# Patient Record
Sex: MALE | Race: WHITE | NOT HISPANIC OR LATINO | URBAN - METROPOLITAN AREA
[De-identification: names, ages, dates, MRNs, and addresses within clinical notes are randomized per-mention and may not be internally consistent; named-entity substitution may affect disease eponyms.]

---
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
## 2018-11-01 ENCOUNTER — APPOINTMENT (OUTPATIENT)
Dept: DERMATOLOGY | Facility: CLINIC | Age: 18
End: 2018-11-01
Payer: MEDICAID

## 2018-11-01 VITALS — BODY MASS INDEX: 26.22 KG/M2 | HEIGHT: 68 IN | WEIGHT: 173 LBS

## 2018-11-01 DIAGNOSIS — M67.40 GANGLION, UNSPECIFIED SITE: ICD-10-CM

## 2018-11-01 PROCEDURE — 99203 OFFICE O/P NEW LOW 30 MIN: CPT

## 2018-12-07 ENCOUNTER — APPOINTMENT (OUTPATIENT)
Dept: PEDIATRIC CARDIOLOGY | Facility: CLINIC | Age: 18
End: 2018-12-07

## 2019-01-08 ENCOUNTER — FORM ENCOUNTER (OUTPATIENT)
Age: 19
End: 2019-01-08

## 2019-01-09 ENCOUNTER — OUTPATIENT (OUTPATIENT)
Dept: OUTPATIENT SERVICES | Facility: HOSPITAL | Age: 19
LOS: 1 days | End: 2019-01-09
Payer: MEDICAID

## 2019-01-09 VITALS — HEART RATE: 60 BPM | SYSTOLIC BLOOD PRESSURE: 110 MMHG | DIASTOLIC BLOOD PRESSURE: 56 MMHG

## 2019-01-09 VITALS — HEART RATE: 60 BPM | SYSTOLIC BLOOD PRESSURE: 111 MMHG | DIASTOLIC BLOOD PRESSURE: 54 MMHG

## 2019-01-09 DIAGNOSIS — Q22.5 EBSTEIN'S ANOMALY: ICD-10-CM

## 2019-01-09 DIAGNOSIS — F84.0 AUTISTIC DISORDER: ICD-10-CM

## 2019-01-09 DIAGNOSIS — Z86.79 PERSONAL HISTORY OF OTHER DISEASES OF THE CIRCULATORY SYSTEM: ICD-10-CM

## 2019-01-09 LAB
ANION GAP SERPL CALC-SCNC: 10 MMOL/L — SIGNIFICANT CHANGE UP (ref 5–17)
BUN SERPL-MCNC: 11 MG/DL — SIGNIFICANT CHANGE UP (ref 8–20)
CALCIUM SERPL-MCNC: 8.6 MG/DL — SIGNIFICANT CHANGE UP (ref 8.6–10.2)
CHLORIDE SERPL-SCNC: 102 MMOL/L — SIGNIFICANT CHANGE UP (ref 98–107)
CO2 SERPL-SCNC: 26 MMOL/L — SIGNIFICANT CHANGE UP (ref 22–29)
CREAT SERPL-MCNC: 0.81 MG/DL — SIGNIFICANT CHANGE UP (ref 0.5–1.3)
GLUCOSE SERPL-MCNC: 97 MG/DL — SIGNIFICANT CHANGE UP (ref 70–115)
HCT VFR BLD CALC: 35 % — LOW (ref 42–52)
HGB BLD-MCNC: 11.4 G/DL — LOW (ref 14–18)
MCHC RBC-ENTMCNC: 30.1 PG — SIGNIFICANT CHANGE UP (ref 27–31)
MCHC RBC-ENTMCNC: 32.6 G/DL — SIGNIFICANT CHANGE UP (ref 32–36)
MCV RBC AUTO: 92.3 FL — SIGNIFICANT CHANGE UP (ref 80–94)
PLATELET # BLD AUTO: 204 K/UL — SIGNIFICANT CHANGE UP (ref 150–400)
POTASSIUM SERPL-MCNC: 3.6 MMOL/L — SIGNIFICANT CHANGE UP (ref 3.5–5.3)
POTASSIUM SERPL-SCNC: 3.6 MMOL/L — SIGNIFICANT CHANGE UP (ref 3.5–5.3)
RBC # BLD: 3.79 M/UL — LOW (ref 4.6–6.2)
RBC # FLD: 13.4 % — SIGNIFICANT CHANGE UP (ref 11–15.6)
SODIUM SERPL-SCNC: 138 MMOL/L — SIGNIFICANT CHANGE UP (ref 135–145)
WBC # BLD: 5.1 K/UL — SIGNIFICANT CHANGE UP (ref 4.8–10.8)
WBC # FLD AUTO: 5.1 K/UL — SIGNIFICANT CHANGE UP (ref 4.8–10.8)

## 2019-01-09 PROCEDURE — 93303 ECHO TRANSTHORACIC: CPT | Mod: 26

## 2019-01-09 PROCEDURE — 93325 DOPPLER ECHO COLOR FLOW MAPG: CPT

## 2019-01-09 PROCEDURE — 93010 ELECTROCARDIOGRAM REPORT: CPT

## 2019-01-09 PROCEDURE — 80048 BASIC METABOLIC PNL TOTAL CA: CPT

## 2019-01-09 PROCEDURE — 36415 COLL VENOUS BLD VENIPUNCTURE: CPT

## 2019-01-09 PROCEDURE — 93320 DOPPLER ECHO COMPLETE: CPT | Mod: 26

## 2019-01-09 PROCEDURE — 85027 COMPLETE CBC AUTOMATED: CPT

## 2019-01-09 PROCEDURE — 93325 DOPPLER ECHO COLOR FLOW MAPG: CPT | Mod: 26

## 2019-01-09 PROCEDURE — 93303 ECHO TRANSTHORACIC: CPT

## 2019-01-09 PROCEDURE — 93005 ELECTROCARDIOGRAM TRACING: CPT

## 2019-01-09 PROCEDURE — 93320 DOPPLER ECHO COMPLETE: CPT

## 2019-01-09 PROCEDURE — 99215 OFFICE O/P EST HI 40 MIN: CPT | Mod: 25

## 2019-01-09 PROCEDURE — 99354: CPT

## 2019-01-09 RX ORDER — ASPIRIN/CALCIUM CARB/MAGNESIUM 324 MG
1 TABLET ORAL
Qty: 0 | Refills: 0 | COMMUNITY

## 2019-01-09 RX ORDER — MIDAZOLAM HYDROCHLORIDE 1 MG/ML
20 INJECTION, SOLUTION INTRAMUSCULAR; INTRAVENOUS ONCE
Qty: 0 | Refills: 0 | Status: DISCONTINUED | OUTPATIENT
Start: 2019-01-09 | End: 2019-01-09

## 2019-01-09 RX ADMIN — MIDAZOLAM HYDROCHLORIDE 20 MILLIGRAM(S): 1 INJECTION, SOLUTION INTRAMUSCULAR; INTRAVENOUS at 08:59

## 2019-01-24 NOTE — CARDIOLOGY SUMMARY
[Today's Date] : [unfilled] [LVSF ___%] : LV Shortening Fraction [unfilled]% [FreeTextEntry1] : NSR @ 57 bpm. RBBB, nonspecific T wave abnormality Prolonged QTc of 494 ms, due to prolonged QRS. Unchanged from previous study. [FreeTextEntry2] : Summary:  \par 1. 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 S/P Cone procedure of Tricuspid valve with right sided Maze procedure October 2010.\par 3. Mild tricuspid valve regurgitation with two jets noted.\par 4. Mildly dilated right atrium.\par 5. Moderately dilated right ventricle.\par 6. Normal left ventricular morphology and systolic function.\par 7. Intact ventricular septum and dyskinesia of ventricular septum related to right bundle branch block.\par 8. Mild mitral valve prolapse\par 9. Mild pulmonary valve regurgitation.\par 10. No pericardial effusion.

## 2019-01-24 NOTE — PHYSICAL EXAM
[General Appearance - Alert] : alert [General Appearance - In No Acute Distress] : in no acute distress [General Appearance - Well Nourished] : well nourished [General Appearance - Well Developed] : well developed [General Appearance - Well-Appearing] : well appearing [Appearance Of Head] : the head was normocephalic [Facies] : there were no dysmorphic facial features [Sclera] : the conjunctiva were normal [Outer Ear] : the ears and nose were normal in appearance [Examination Of The Oral Cavity] : mucous membranes were moist and pink [Normal Chest Appearance] : the chest was normal in appearance [Chest Palpation Tender Sternum] : no chest wall tenderness [Sternotomy] : sternotomy [Clean] : clean [Dry] : dry [Well-Healed] : well-healed [Flat/Soft] : flat and soft [Apical Impulse] : quiet precordium with normal apical impulse [Heart Rate And Rhythm] : normal heart rate and rhythm [Heart Sounds] : normal S1 and S2 [No Murmur] : no murmurs  [Heart Sounds Gallop] : no gallops [Heart Sounds Pericardial Friction Rub] : no pericardial rub [Heart Sounds Click] : no clicks [Arterial Pulses] : normal upper and lower extremity pulses with no pulse delay [Edema] : no edema [Capillary Refill Test] : normal capillary refill [Systolic] : systolic [II] : a grade 2/6 [LMSB] : LMSB  [Holosystolic] : holosystolic [Low] : low pitched [Blowing] : blowing [Early] : early [Musculoskeletal Exam: Normal Movement Of All Extremities] : normal movements of all extremities [Musculoskeletal - Swelling] : no joint swelling seen [Musculoskeletal - Tenderness] : no joint tenderness was elicited [Nail Clubbing] : no clubbing  or cyanosis of the fingers [Cervical Lymph Nodes Enlarged Anterior] : The anterior cervical nodes were normal [Cervical Lymph Nodes Enlarged Posterior] : The posterior cervical nodes were normal [Skin Lesions] : no lesions [Skin Turgor] : normal turgor [Auscultation Breath Sounds / Voice Sounds] : breath sounds clear to auscultation bilaterally [Bowel Sounds] : normal bowel sounds [Abdomen Soft] : soft [Nondistended] : nondistended [Abdomen Tenderness] : non-tender [] : no hepato-splenomegaly [Motor Tone] : muscle strength and tone were normal [Demonstrated Behavior - Infant Nonreactive To Parents] : interactive [Mood] : mood and affect were appropriate for age [Demonstrated Behavior] : normal behavior [FreeTextEntry1] : frontal healed scar from previous head-banging.

## 2019-01-24 NOTE — CONSULT LETTER
[Today's Date] : [unfilled] [Name] : Name: [unfilled] [] : : ~~ [Today's Date:] : [unfilled] [Dear  ___:] : Dear Dr. [unfilled]: [Consult] : I had the pleasure of evaluating your patient, [unfilled]. My full evaluation follows. [Consult - Single Provider] : Thank you very much for allowing me to participate in the care of this patient. If you have any questions, please do not hesitate to contact me. [Sincerely,] : Sincerely, [David Napier MD, FAAP, FACC, FASE] : David Napier MD, FAAP, FACC, FASE [Pediatric Cardiologist] : Pediatric Cardiologist [FreeTextEntry4] : Keerthi Tello MD [FreeTextEntry5] : [address] [FreeTextEntry6] : [place] [address]

## 2019-01-24 NOTE — REASON FOR VISIT
[Follow-Up] : a follow-up visit for [S/P Cardiac Surgery] : status post cardiac surgery [Mother] : mother [FreeTextEntry1] : Ebstein's Anomaly

## 2019-01-24 NOTE — DISCUSSION/SUMMARY
[Needs SBE Prophylaxis] : [unfilled] does not need bacterial endocarditis prophylaxis [FreeTextEntry1] : No cardiac contraindications for Behavioral plan deemed necessary by Medical director of [facility].

## 2019-01-24 NOTE — HISTORY OF PRESENT ILLNESS
[FreeTextEntry1] : Silvio is almost 18-year-old, who is been followed by me for severe Autism, Ebstein's anomaly, status post repair. He is being seen as a followup visit since his last evaluation a 1 years back.  He was examined while he is get sedated echocardiogram today.  We have tried to do a visit in office, but he has not been cooperative. He was seen during sedation today at [facility]. Since then according to his mother, he is doing well.  He is nonverbal. He has no symptoms regarding the cardiovascular system in the form of chest pain, palpitations, dizziness, easy fatigability, shortness of breath or syncope.  He has had emergency room visits for stitches in his head in the past. He had allergic reaction to Ketamine for his oral surgery at [facility] and had seizures. He has had no recent major illnesses or any hospitalizations.There is no interval changes the health status. \par Past medical history: He had cone procedure, right sided maze and primary PFO closure on October 8, 2010 by Dr. Tera Ramsey at [facility]. He also had Reveal arrhythmia detection device placed that became end of life. He was followed by GI and by psychiatry.

## 2020-01-09 ENCOUNTER — APPOINTMENT (OUTPATIENT)
Dept: PEDIATRIC CARDIOLOGY | Facility: CLINIC | Age: 20
End: 2020-01-09

## 2020-01-14 ENCOUNTER — FORM ENCOUNTER (OUTPATIENT)
Age: 20
End: 2020-01-14

## 2020-01-15 ENCOUNTER — OUTPATIENT (OUTPATIENT)
Dept: OUTPATIENT SERVICES | Facility: HOSPITAL | Age: 20
LOS: 1 days | End: 2020-01-15
Payer: MEDICAID

## 2020-01-15 VITALS — RESPIRATION RATE: 16 BRPM | HEART RATE: 53 BPM | OXYGEN SATURATION: 96 %

## 2020-01-15 VITALS
DIASTOLIC BLOOD PRESSURE: 43 MMHG | RESPIRATION RATE: 18 BRPM | SYSTOLIC BLOOD PRESSURE: 96 MMHG | OXYGEN SATURATION: 99 % | HEART RATE: 50 BPM | WEIGHT: 160.94 LBS | HEIGHT: 66.14 IN | BODY MASS INDEX: 25.86 KG/M2

## 2020-01-15 VITALS — WEIGHT: 160.06 LBS | HEIGHT: 67 IN

## 2020-01-15 DIAGNOSIS — Z98.890 OTHER SPECIFIED POSTPROCEDURAL STATES: ICD-10-CM

## 2020-01-15 DIAGNOSIS — Q22.5 EBSTEIN'S ANOMALY: ICD-10-CM

## 2020-01-15 DIAGNOSIS — F84.0 AUTISTIC DISORDER: ICD-10-CM

## 2020-01-15 DIAGNOSIS — Z86.79 PERSONAL HISTORY OF OTHER DISEASES OF THE CIRCULATORY SYSTEM: ICD-10-CM

## 2020-01-15 PROCEDURE — 93306 TTE W/DOPPLER COMPLETE: CPT

## 2020-01-15 PROCEDURE — 80074 ACUTE HEPATITIS PANEL: CPT

## 2020-01-15 PROCEDURE — 36415 COLL VENOUS BLD VENIPUNCTURE: CPT

## 2020-01-15 PROCEDURE — 93005 ELECTROCARDIOGRAM TRACING: CPT

## 2020-01-15 PROCEDURE — 85610 PROTHROMBIN TIME: CPT

## 2020-01-15 PROCEDURE — 84443 ASSAY THYROID STIM HORMONE: CPT

## 2020-01-15 PROCEDURE — 93010 ELECTROCARDIOGRAM REPORT: CPT

## 2020-01-15 PROCEDURE — 85730 THROMBOPLASTIN TIME PARTIAL: CPT

## 2020-01-15 RX ORDER — DIVALPROEX SODIUM 500 MG/1
TABLET, DELAYED RELEASE ORAL
Refills: 0 | Status: ACTIVE | COMMUNITY

## 2020-01-15 NOTE — HISTORY OF PRESENT ILLNESS
[FreeTextEntry1] : Silvio is  19-year-old, who is been followed by me for severe Autism, Ebstein's anomaly, status post repair. He is being seen as a followup visit since his last evaluation a 1 years back.  He was examined while he is get sedated echocardiogram today at [facility].  We have tried to do a visit in office, but he has not been cooperative. He was seen during sedation today at [facility]. Since then according to his mother, he is doing well.  He is nonverbal. He has no symptoms regarding the cardiovascular system in the form of chest pain, palpitations, dizziness, easy fatigability, shortness of breath or syncope.  He has had emergency room visits for stitches in his head in the past. He had allergic reaction to Ketamine for his oral surgery at [facility] and had seizures. He has had no recent major illnesses or any hospitalizations.There is no interval changes the health status. \par Past medical history: He had cone procedure, right sided maze and primary PFO closure on October 8, 2010 by Dr. Tera Ramsey at [facility]. He also had Reveal arrhythmia detection device placed that became end of life. He was followed by GI and by psychiatry.

## 2020-01-15 NOTE — CARDIOLOGY SUMMARY
[Today's Date] : [unfilled] [LVSF ___%] : LV Shortening Fraction [unfilled]% [FreeTextEntry1] : NSR @ 49 bpm, first degree AV block.. IPE=886 RBBB, nonspecific T wave abnormality Prolonged QTc of 466 ms, due to prolonged QRS. Abnormal EKGy. [FreeTextEntry2] : Summary:  \par 1. 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 S/P Cone procedure of Tricuspid valve with right sided Maze procedure October 2010.\par 3. Mild tricuspid valve regurgitation with two jets noted.\par 4. Mildly dilated right atrium.\par 5. Moderately dilated right ventricle.\par 6. Normal left ventricular morphology and systolic function.\par 7. Intact ventricular septum and dyskinesia of ventricular septum .\par 8. Mild mitral valve prolapse\par 9. Mild pulmonary valve regurgitation.\par 10. No pericardial effusion.

## 2020-01-15 NOTE — PHYSICAL EXAM
[General Appearance - Alert] : alert [General Appearance - Well Nourished] : well nourished [General Appearance - Well-Appearing] : well appearing [General Appearance - In No Acute Distress] : in no acute distress [General Appearance - Well Developed] : well developed [Facies] : there were no dysmorphic facial features [Appearance Of Head] : the head was normocephalic [Sclera] : the conjunctiva were normal [Examination Of The Oral Cavity] : mucous membranes were moist and pink [Outer Ear] : the ears and nose were normal in appearance [Normal Chest Appearance] : the chest was normal in appearance [Chest Palpation Tender Sternum] : no chest wall tenderness [Clean] : clean [Sternotomy] : sternotomy [Flat/Soft] : flat and soft [Dry] : dry [Well-Healed] : well-healed [Heart Rate And Rhythm] : normal heart rate and rhythm [Apical Impulse] : quiet precordium with normal apical impulse [Heart Sounds] : normal S1 and S2 [Heart Sounds Gallop] : no gallops [No Murmur] : no murmurs  [Heart Sounds Pericardial Friction Rub] : no pericardial rub [Heart Sounds Click] : no clicks [Capillary Refill Test] : normal capillary refill [Edema] : no edema [Arterial Pulses] : normal upper and lower extremity pulses with no pulse delay [II] : a grade 2/6 [Systolic] : systolic [Holosystolic] : holosystolic [LMSB] : LMSB  [Low] : low pitched [Blowing] : blowing [Early] : early [Musculoskeletal - Swelling] : no joint swelling seen [Musculoskeletal Exam: Normal Movement Of All Extremities] : normal movements of all extremities [Musculoskeletal - Tenderness] : no joint tenderness was elicited [Nail Clubbing] : no clubbing  or cyanosis of the fingers [Cervical Lymph Nodes Enlarged Posterior] : The posterior cervical nodes were normal [Cervical Lymph Nodes Enlarged Anterior] : The anterior cervical nodes were normal [Skin Lesions] : no lesions [Skin Turgor] : normal turgor [Bowel Sounds] : normal bowel sounds [Abdomen Soft] : soft [Auscultation Breath Sounds / Voice Sounds] : breath sounds clear to auscultation bilaterally [] : no hepato-splenomegaly [Nondistended] : nondistended [Abdomen Tenderness] : non-tender [Mood] : mood and affect were appropriate for age [Motor Tone] : muscle strength and tone were normal [Demonstrated Behavior - Infant Nonreactive To Parents] : interactive [Demonstrated Behavior] : normal behavior [FreeTextEntry1] : frontal healed scar from previous head-banging.

## 2020-01-15 NOTE — REVIEW OF SYSTEMS
[Feeling Poorly] : not feeling poorly (malaise) [Pallor] : not pale [Fever] : no fever [Wgt Loss (___ Lbs)] : no recent weight loss [Eye Discharge] : no eye discharge [Redness] : no redness [Change in Vision] : no change in vision [Sore Throat] : no sore throat [Nasal Stuffiness] : no nasal congestion [Earache] : no earache [Loss Of Hearing] : no hearing loss [Cyanosis] : no cyanosis [Edema] : no edema [Diaphoresis] : not diaphoretic [Chest Pain] : no chest pain or discomfort [Palpitations] : no palpitations [Exercise Intolerance] : no persistence of exercise intolerance [Fast HR] : no tachycardia [Orthopnea] : no orthopnea [Tachypnea] : not tachypneic [Wheezing] : no wheezing [Cough] : no cough [Diarrhea] : no diarrhea [Vomiting] : no vomiting [Shortness Of Breath] : not expressed as feeling short of breath [Abdominal Pain] : no abdominal pain [Decrease In Appetite] : appetite not decreased [Fainting (Syncope)] : no fainting [Headache] : no headache [Seizure] : no seizures [Dizziness] : no dizziness [Limping] : no limping [Joint Pains] : no arthralgias [Wound problems] : no wound problems [Joint Swelling] : no joint swelling [Rash] : no rash [Easy Bruising] : no tendency for easy bruising [Swollen Glands] : no lymphadenopathy [Easy Bleeding] : no ~M tendency for easy bleeding [Sleep Disturbances] : ~T no sleep disturbances [Nosebleeds] : no epistaxis [Depression] : no depression [Anxiety] : no anxiety [Hyperactive] : no hyperactive behavior [Short Stature] : short stature was not noted [Failure To Thrive] : no failure to thrive [Jitteriness] : no jitteriness [Heat/Cold Intolerance] : no temperature intolerance [Dec Urine Output] : no oliguria

## 2020-01-15 NOTE — CONSULT LETTER
[Today's Date] : [unfilled] [Name] : Name: [unfilled] [] : : ~~ [Today's Date:] : [unfilled] [Dear  ___:] : Dear Dr. [unfilled]: [Consult] : I had the pleasure of evaluating your patient, [unfilled]. My full evaluation follows. [Consult - Single Provider] : Thank you very much for allowing me to participate in the care of this patient. If you have any questions, please do not hesitate to contact me. [Sincerely,] : Sincerely, [Pediatric Cardiologist] : Pediatric Cardiologist [David Napier MD, FAAP, FACC, FASE] : David Napier MD, FAAP, FACC, FASE [FreeTextEntry4] : DOLORES Nicholas MD [FreeTextEntry6] : [address] [facility] [FreeTextEntry5] : [facility] [FreeTextEntry7] : [place] [address] [de-identified] : David Napier MD, FACC, SVETA, FAAP\par Pediatric Cardiologist\par  Pediatrics, [facility] [employer]\par [facility]\par [address] [place] [address], [address]\[place] [address]\par \par

## 2020-01-15 NOTE — DISCUSSION/SUMMARY
[Needs SBE Prophylaxis] : [unfilled] does not need bacterial endocarditis prophylaxis [FreeTextEntry1] : No cardiac contraindications for Behavioral plan deemed necessary by Medical director of [facility].

## 2020-03-05 ENCOUNTER — APPOINTMENT (OUTPATIENT)
Dept: PEDIATRIC CARDIOLOGY | Facility: CLINIC | Age: 20
End: 2020-03-05

## 2020-12-22 ENCOUNTER — NON-APPOINTMENT (OUTPATIENT)
Age: 20
End: 2020-12-22

## 2020-12-22 ENCOUNTER — OUTPATIENT (OUTPATIENT)
Dept: OUTPATIENT SERVICES | Facility: HOSPITAL | Age: 20
LOS: 1 days | End: 2020-12-22
Payer: MEDICAID

## 2020-12-22 VITALS
WEIGHT: 145.06 LBS | SYSTOLIC BLOOD PRESSURE: 104 MMHG | TEMPERATURE: 97 F | DIASTOLIC BLOOD PRESSURE: 70 MMHG | RESPIRATION RATE: 16 BRPM | HEART RATE: 90 BPM | OXYGEN SATURATION: 97 % | HEIGHT: 66 IN

## 2020-12-22 DIAGNOSIS — Q22.5 EBSTEIN'S ANOMALY: ICD-10-CM

## 2020-12-22 DIAGNOSIS — K02.62 DENTAL CARIES ON SMOOTH SURFACE PENETRATING INTO DENTIN: ICD-10-CM

## 2020-12-22 DIAGNOSIS — R56.9 UNSPECIFIED CONVULSIONS: ICD-10-CM

## 2020-12-22 DIAGNOSIS — K05.6 PERIODONTAL DISEASE, UNSPECIFIED: ICD-10-CM

## 2020-12-22 DIAGNOSIS — Z98.890 OTHER SPECIFIED POSTPROCEDURAL STATES: Chronic | ICD-10-CM

## 2020-12-22 DIAGNOSIS — Z01.818 ENCOUNTER FOR OTHER PREPROCEDURAL EXAMINATION: ICD-10-CM

## 2020-12-22 DIAGNOSIS — K02.9 DENTAL CARIES, UNSPECIFIED: ICD-10-CM

## 2020-12-22 DIAGNOSIS — Z92.89 PERSONAL HISTORY OF OTHER MEDICAL TREATMENT: Chronic | ICD-10-CM

## 2020-12-22 PROCEDURE — G0463: CPT

## 2020-12-22 RX ORDER — LIDOCAINE HCL 20 MG/ML
0.2 VIAL (ML) INJECTION ONCE
Refills: 0 | Status: DISCONTINUED | OUTPATIENT
Start: 2020-12-28 | End: 2021-01-11

## 2020-12-22 RX ORDER — SODIUM CHLORIDE 9 MG/ML
3 INJECTION INTRAMUSCULAR; INTRAVENOUS; SUBCUTANEOUS EVERY 8 HOURS
Refills: 0 | Status: DISCONTINUED | OUTPATIENT
Start: 2020-12-28 | End: 2021-01-11

## 2020-12-22 NOTE — H&P PST ADULT - HISTORY OF PRESENT ILLNESS
20 Year OLD MALE accompanied by mom and aid with PMH of Seizures ( 4 episodes last episode 11/25/2020 with ER visit followed by neuro last visit 12/3/2020), Severe Autism, Ebstein's anamoly/SVT s/p Cone procedure, right sided maze and primary PFO closure 2010, had loop recorder in the past which was removed in 2017  planned for comprehensive dental treatment under anesthesia on 12/28/2020.    *** covid  12/26 @ [facility]     ****According to mom, 2018 for dental procedure [facility] patient received ketamine and had a seizure episode requiring ER visit, denies any hospitalization or intubation, patient had dental procedure 12/2019 with Dr. Guerrier @ [facility] with Pro 20 Year OLD MALE accompanied by mom and aid with PMH of Seizures ( 4 episodes last episode 11/25/2020 with ER visit followed by neuro last visit 12/3/2020), Severe Autism, Ebstein's anamoly/SVT s/p Cone procedure, right sided maze and primary PFO closure 2010, had loop recorder in the past which was removed in 2017  planned for comprehensive dental treatment under anesthesia on 12/28/2020.    *** covid  12/26 @ [facility]     ****According to mom, 2018 for dental procedure @ [facility] patient received ketamine and had a seizure episode requiring ER visit, denies any hospitalization or intubation, patient had dental procedure 12/2019 with Dr. Guerrier @ [facility] with no reactions or complications.  20 Year OLD MALE accompanied by mom and aid with PMH of Seizures ( 4 episodes last episode 11/25/2020 with ER visit followed by neuro last visit 12/3/2020), Severe Autism, Ebstein's anamoly/SVT s/p Cone procedure, right sided maze and primary PFO closure 2010, had loop recorder in the past which was removed in 2017  planned for comprehensive dental treatment under anesthesia on 12/28/2020.    *** covid  12/26 @ [facility]     ****According to mom, 2018 for dental procedure @ [facility] patient received ketamine and had a seizure episode requiring ER visit, denies any hospitalization or intubation, patient had dental procedure 12/2019 with Dr. Guerrier @ [facility] using Propofol with no reactions or complications.  20 Year OLD MALE accompanied by mom and aid with PMH of Seizures ( 4 episodes last episode 11/25/2020 with ER visit followed by neuro last visit 12/3/2020), Severe Autism, Ebstein's anamoly/SVT s/p Cone procedure, right sided maze and primary PFO closure 2010, had loop recorder in the past which was removed in 2017  planned for comprehensive dental treatment under anesthesia on 12/28/2020.    *** [facility]     ****According to mom, 2018 for dental procedure @ [facility] patient received ketamine and had a seizure episode requiring ER visit, denies any hospitalization or intubation, patient had dental procedure 12/2019 with Dr. Guerrier @ [facility] using Propofol with no reactions or complications.   **** Case discussed with anesthesia via teams, Dr. Morris recommended cardiac eval and echo prior to procedure   *****12/22/2020 1520 Called spoke to group home regarding yearly cardiac evaluation date, group home awaiting call back from Dr. mccallum's office regarding appointment. Called Spoke to Dr. Sherman and discussed,  She will speak to Group home regarding this.  received call back from Dr. Sherman, she will speak to patient's cardiologist and discuss case with Dr. Morris.  20 Year OLD MALE accompanied by mom and aid with PMH of Seizures ( 4 episodes last episode 11/25/2020 with ER visit followed by neuro last visit 12/3/2020), Severe Autism, Ebstein's anamoly/SVT s/p Cone procedure, right sided maze and primary PFO closure 2010, had loop recorder in the past which was removed in 2017  planned for comprehensive dental treatment under anesthesia on 12/28/2020.    *** covid  12/26 @ [facility]     ****According to mom, 2018 for dental procedure @ [facility] patient received ketamine and had a seizure episode requiring ER visit, denies any hospitalization or intubation, patient had dental procedure 12/2019 with Dr. Guerrier @ [facility] using Propofol with no reactions or complications.   **** Case discussed with anesthesia via teams, Dr. Morris recommended cardiac eval and echo prior to procedure   *****12/22/2020 1520 Called spoke to group home regarding yearly cardiac evaluation date, group home awaiting call back from Dr. mccallum's office regarding appointment. Called Spoke to Dr. Sherman and discussed,  She will speak to Group home regarding this.  received call back from Dr. Sherman, she will speak to patient's cardiologist and discuss case with Dr. Morris.     ****12/22 1540 according to ,  ( cardio) office will be faxing a cardiac clearance letter to [facility].

## 2020-12-22 NOTE — H&P PST ADULT - NSICDXPASTMEDICALHX_GEN_ALL_CORE_FT
PAST MEDICAL HISTORY:  Autism severe    Dental caries     Ebstein anomaly sx 2010    Seizures onset 1604-0779, last episode 11/25/2020 with ER visit, followed by neuro last visit 12/3/2020 as per mom

## 2020-12-22 NOTE — H&P PST ADULT - LAST ECHOCARDIOGRAM
1/2020 1/2020 Severe ebstein's anomaly of tricuspid valve, s/p cone procedure, mild TR, normal LV, LV shortening 39%.

## 2020-12-22 NOTE — H&P PST ADULT - ANESTHESIA, PREVIOUS REACTION, PROFILE
2018 with dental procedure [facility] patient had ketamine prior to dental procedure went into seizure episode and ER visit

## 2020-12-22 NOTE — H&P PST ADULT - NSICDXPASTSURGICALHX_GEN_ALL_CORE_FT
PAST SURGICAL HISTORY:  H/O heart surgery 2010 planned for comprehensive dental treatment under anesthesia on 12/28/2020.      History of dental surgery 2019 with Dr. Esquivel at [facility] tolerated well     PAST SURGICAL HISTORY:  H/O heart surgery 2010       History of dental surgery 2019 with Dr. Esquivel at [facility] tolerated well

## 2020-12-22 NOTE — H&P PST ADULT - NSICDXPROBLEM_GEN_ALL_CORE_FT
PROBLEM DIAGNOSES  Problem: Dental caries  Assessment and Plan:     Problem: Ebstein anomaly  Assessment and Plan: last EKG/echo/cardiac evaal 1/2020 on file    Problem: Seizures  Assessment and Plan: seizure precuaitons  antiseizure meds am of procedure      R/O PROBLEM DIAGNOSES  Problem: Ebstein anomaly  Assessment and Plan:      PROBLEM DIAGNOSES  Problem: Dental caries  Assessment and Plan:     Problem: Ebstein anomaly  Assessment and Plan: last EKG/echo/cardiac evaal 1/2020 on file    Problem: Seizures  Assessment and Plan: seizure precuaitons  antiseizure meds am of procedure  will obtain neuro eval 12/3      R/O PROBLEM DIAGNOSES  Problem: Ebstein anomaly  Assessment and Plan:      PROBLEM DIAGNOSES  Problem: Dental caries  Assessment and Plan: planned for comprehensive dental treatment under anesthesia on 12/28/2020.  comprehensive dental treatment under anesthesia   will obtain recent labs and medical eval from PCP  given instructions to mom and aid    Problem: Ebstein anomaly  Assessment and Plan: last EKG/echo/cardiac evaal 1/2020 on file       Problem: Seizures  Assessment and Plan: seizure precuations  antiseizure meds am of procedure  will obtain neuro eval 12/3

## 2020-12-26 ENCOUNTER — OUTPATIENT (OUTPATIENT)
Dept: OUTPATIENT SERVICES | Facility: HOSPITAL | Age: 20
LOS: 1 days | End: 2020-12-26
Payer: MEDICAID

## 2020-12-26 DIAGNOSIS — Z98.890 OTHER SPECIFIED POSTPROCEDURAL STATES: Chronic | ICD-10-CM

## 2020-12-26 DIAGNOSIS — Z92.89 PERSONAL HISTORY OF OTHER MEDICAL TREATMENT: Chronic | ICD-10-CM

## 2020-12-26 DIAGNOSIS — Z20.828 CONTACT WITH AND (SUSPECTED) EXPOSURE TO OTHER VIRAL COMMUNICABLE DISEASES: ICD-10-CM

## 2020-12-26 LAB — SARS-COV-2 RNA SPEC QL NAA+PROBE: SIGNIFICANT CHANGE UP

## 2020-12-26 PROCEDURE — C9803: CPT

## 2020-12-26 PROCEDURE — U0003: CPT

## 2020-12-27 ENCOUNTER — TRANSCRIPTION ENCOUNTER (OUTPATIENT)
Age: 20
End: 2020-12-27

## 2020-12-27 RX ORDER — SODIUM CHLORIDE 9 MG/ML
1000 INJECTION, SOLUTION INTRAVENOUS
Refills: 0 | Status: DISCONTINUED | OUTPATIENT
Start: 2020-12-28 | End: 2021-01-11

## 2020-12-27 NOTE — ASU DISCHARGE PLAN (ADULT/PEDIATRIC) - ASU DC SPECIAL INSTRUCTIONSFT
comprehensive dental tx under general anesthesia    tylenol prn pain     see Dr Sherman in one to two weeks     return to program on Wednesday or tomorrow if patient feels well    resume all medications

## 2020-12-27 NOTE — ASU DISCHARGE PLAN (ADULT/PEDIATRIC) - NURSING INSTRUCTIONS
******************************************************************************************  Next dose of TYLENOL may be taken at or after __________3.45__ PM if needed. DO NOT take any additional products containing TYLENOL or ACETAMINOPHEN, such as VICODIN, PERCOCET, NORCO, EXCEDRIN, and any over-the-counter cold medications until this time. DO NOT CONSUME MORE THAN 4817-8517 MG OF TYLENOL (acetaminophen) in a 24-hour period.

## 2020-12-28 ENCOUNTER — OUTPATIENT (OUTPATIENT)
Dept: OUTPATIENT SERVICES | Facility: HOSPITAL | Age: 20
LOS: 1 days | End: 2020-12-28
Payer: MEDICAID

## 2020-12-28 VITALS
SYSTOLIC BLOOD PRESSURE: 128 MMHG | OXYGEN SATURATION: 100 % | HEART RATE: 69 BPM | RESPIRATION RATE: 16 BRPM | HEIGHT: 66 IN | TEMPERATURE: 98 F | DIASTOLIC BLOOD PRESSURE: 76 MMHG | WEIGHT: 145.06 LBS

## 2020-12-28 VITALS
RESPIRATION RATE: 18 BRPM | TEMPERATURE: 98 F | OXYGEN SATURATION: 100 % | DIASTOLIC BLOOD PRESSURE: 57 MMHG | SYSTOLIC BLOOD PRESSURE: 103 MMHG | HEART RATE: 88 BPM

## 2020-12-28 DIAGNOSIS — Z01.818 ENCOUNTER FOR OTHER PREPROCEDURAL EXAMINATION: ICD-10-CM

## 2020-12-28 DIAGNOSIS — Z98.890 OTHER SPECIFIED POSTPROCEDURAL STATES: Chronic | ICD-10-CM

## 2020-12-28 DIAGNOSIS — K05.6 PERIODONTAL DISEASE, UNSPECIFIED: ICD-10-CM

## 2020-12-28 DIAGNOSIS — K02.62 DENTAL CARIES ON SMOOTH SURFACE PENETRATING INTO DENTIN: ICD-10-CM

## 2020-12-28 DIAGNOSIS — Z92.89 PERSONAL HISTORY OF OTHER MEDICAL TREATMENT: Chronic | ICD-10-CM

## 2020-12-28 PROCEDURE — D2394: CPT

## 2020-12-28 PROCEDURE — D2331: CPT

## 2020-12-28 PROCEDURE — D2392: CPT

## 2020-12-28 PROCEDURE — D4341: CPT

## 2020-12-28 PROCEDURE — 41820 EXCISION GUM EACH QUADRANT: CPT

## 2020-12-28 PROCEDURE — C9399: CPT

## 2020-12-28 NOTE — PRE-ANESTHESIA EVALUATION ADULT - LAST ECHOCARDIOGRAM
1/2020 Severe ebstein's anomaly of tricuspid valve, s/p cone procedure, mild TR, normal LV, LV shortening 39%.

## 2020-12-28 NOTE — ASU PATIENT PROFILE, ADULT - PSH
H/O heart surgery  2010     History of dental surgery  2019 with Dr. Esquivel at [facility] tolerated well

## 2020-12-28 NOTE — ASU PATIENT PROFILE, ADULT - PMH
Autism  severe  Dental caries    Ebstein anomaly  sx 2010  Seizures  onset [id number], last episode 11/25/2020 with ER visit, followed by neuro last visit 12/3/2020 as per mom

## 2020-12-29 NOTE — BRIEF OPERATIVE NOTE - NSICDXBRIEFOPLAUNCH_GEN_ALL_CORE
Patient brought back to Infusion department for phlebotomy after MD visit. Order to take off 500mL. Patient tolerated well with no complaints. PO fluids offered to the patient.    <--- Click to Launch ICDx for PreOp, PostOp and Procedure

## 2021-05-16 ENCOUNTER — TRANSCRIPTION ENCOUNTER (OUTPATIENT)
Age: 21
End: 2021-05-16

## 2021-05-31 ENCOUNTER — TRANSCRIPTION ENCOUNTER (OUTPATIENT)
Age: 21
End: 2021-05-31

## 2021-06-17 PROBLEM — Z00.00 ENCOUNTER FOR PREVENTIVE HEALTH EXAMINATION: Noted: 2021-06-17

## 2021-06-25 ENCOUNTER — APPOINTMENT (OUTPATIENT)
Dept: PEDIATRIC CARDIOLOGY | Facility: CLINIC | Age: 21
End: 2021-06-25

## 2021-08-05 NOTE — ASU PATIENT PROFILE, ADULT - AS SC [facility] [employer]
Alert-The patient is alert, awake and responds to voice. The patient is oriented to time, place, and person. The triage nurse is able to obtain subjective information.
(4) no limitation

## 2021-08-26 DIAGNOSIS — Z98.890 OTHER SPECIFIED POSTPROCEDURAL STATES: ICD-10-CM

## 2021-08-27 ENCOUNTER — APPOINTMENT (OUTPATIENT)
Dept: PEDIATRIC CARDIOLOGY | Facility: CLINIC | Age: 21
End: 2021-08-27

## 2021-10-26 PROBLEM — K02.9 DENTAL CARIES, UNSPECIFIED: Chronic | Status: ACTIVE | Noted: 2020-12-22

## 2021-10-29 ENCOUNTER — OUTPATIENT (OUTPATIENT)
Dept: OUTPATIENT SERVICES | Facility: HOSPITAL | Age: 21
LOS: 1 days | End: 2021-10-29
Payer: MEDICAID

## 2021-10-29 ENCOUNTER — TRANSCRIPTION ENCOUNTER (OUTPATIENT)
Age: 21
End: 2021-10-29

## 2021-10-29 VITALS — WEIGHT: 164.02 LBS | HEIGHT: 66 IN

## 2021-10-29 DIAGNOSIS — Z98.890 OTHER SPECIFIED POSTPROCEDURAL STATES: Chronic | ICD-10-CM

## 2021-10-29 DIAGNOSIS — K05.6 PERIODONTAL DISEASE, UNSPECIFIED: ICD-10-CM

## 2021-10-29 DIAGNOSIS — Z01.818 ENCOUNTER FOR OTHER PREPROCEDURAL EXAMINATION: ICD-10-CM

## 2021-10-29 DIAGNOSIS — Z92.89 PERSONAL HISTORY OF OTHER MEDICAL TREATMENT: Chronic | ICD-10-CM

## 2021-10-29 DIAGNOSIS — K02.63 DENTAL CARIES ON SMOOTH SURFACE PENETRATING INTO PULP: ICD-10-CM

## 2021-10-29 DIAGNOSIS — K02.62 DENTAL CARIES ON SMOOTH SURFACE PENETRATING INTO DENTIN: ICD-10-CM

## 2021-10-29 LAB
ANION GAP SERPL CALC-SCNC: 12 MMOL/L — SIGNIFICANT CHANGE UP (ref 5–17)
BUN SERPL-MCNC: 10 MG/DL — SIGNIFICANT CHANGE UP (ref 7–23)
CALCIUM SERPL-MCNC: 9.6 MG/DL — SIGNIFICANT CHANGE UP (ref 8.4–10.5)
CHLORIDE SERPL-SCNC: 103 MMOL/L — SIGNIFICANT CHANGE UP (ref 96–108)
CO2 SERPL-SCNC: 26 MMOL/L — SIGNIFICANT CHANGE UP (ref 22–31)
CREAT SERPL-MCNC: 0.78 MG/DL — SIGNIFICANT CHANGE UP (ref 0.5–1.3)
GLUCOSE SERPL-MCNC: 78 MG/DL — SIGNIFICANT CHANGE UP (ref 70–99)
HCT VFR BLD CALC: 40.8 % — SIGNIFICANT CHANGE UP (ref 39–50)
HGB BLD-MCNC: 13.3 G/DL — SIGNIFICANT CHANGE UP (ref 13–17)
MCHC RBC-ENTMCNC: 31.7 PG — SIGNIFICANT CHANGE UP (ref 27–34)
MCHC RBC-ENTMCNC: 32.6 GM/DL — SIGNIFICANT CHANGE UP (ref 32–36)
MCV RBC AUTO: 97.4 FL — SIGNIFICANT CHANGE UP (ref 80–100)
NRBC # BLD: 0 /100 WBCS — SIGNIFICANT CHANGE UP (ref 0–0)
PLATELET # BLD AUTO: 208 K/UL — SIGNIFICANT CHANGE UP (ref 150–400)
POTASSIUM SERPL-MCNC: 4.8 MMOL/L — SIGNIFICANT CHANGE UP (ref 3.5–5.3)
POTASSIUM SERPL-SCNC: 4.8 MMOL/L — SIGNIFICANT CHANGE UP (ref 3.5–5.3)
RBC # BLD: 4.19 M/UL — LOW (ref 4.2–5.8)
RBC # FLD: 13 % — SIGNIFICANT CHANGE UP (ref 10.3–14.5)
SODIUM SERPL-SCNC: 141 MMOL/L — SIGNIFICANT CHANGE UP (ref 135–145)
WBC # BLD: 6.75 K/UL — SIGNIFICANT CHANGE UP (ref 3.8–10.5)
WBC # FLD AUTO: 6.75 K/UL — SIGNIFICANT CHANGE UP (ref 3.8–10.5)

## 2021-10-29 PROCEDURE — 71045 X-RAY EXAM CHEST 1 VIEW: CPT

## 2021-10-29 PROCEDURE — 71045 X-RAY EXAM CHEST 1 VIEW: CPT | Mod: 26

## 2021-10-29 PROCEDURE — 80048 BASIC METABOLIC PNL TOTAL CA: CPT

## 2021-10-29 PROCEDURE — 36415 COLL VENOUS BLD VENIPUNCTURE: CPT

## 2021-10-29 PROCEDURE — G0463: CPT

## 2021-10-29 PROCEDURE — 85027 COMPLETE CBC AUTOMATED: CPT

## 2021-10-29 RX ORDER — ARIPIPRAZOLE 15 MG/1
0.5 TABLET ORAL
Qty: 0 | Refills: 0 | DISCHARGE

## 2021-10-29 RX ORDER — RISPERIDONE 4 MG/1
3 TABLET ORAL
Qty: 0 | Refills: 0 | DISCHARGE

## 2021-10-29 RX ORDER — DIVALPROEX SODIUM 500 MG/1
4 TABLET, DELAYED RELEASE ORAL
Qty: 0 | Refills: 0 | DISCHARGE

## 2021-10-29 NOTE — H&P PST ADULT - NSICDXPASTSURGICALHX_GEN_ALL_CORE_FT
PAST SURGICAL HISTORY:  H/O heart surgery 2010       History of dental surgery 2019 with Dr. Esquivel at [facility] tolerated well

## 2021-10-29 NOTE — H&P PST ADULT - HISTORY OF PRESENT ILLNESS
20 Year OLD MALE accompanied by mom and aid with PMH of Seizures ( 4 episodes last episode 11/25/2020 with ER visit followed by neuro last visit 12/3/2020), Severe Autism, Ebstein's anamoly/SVT s/p Cone procedure, right sided maze and primary PFO closure 2010, had loop recorder in the past which was removed in 2017  planned for comprehensive dental treatment under anesthesia on 12/28/2020.    *** covid  12/26 @ [facility]     ****According to mom, 2018 for dental procedure @ [facility] patient received ketamine and had a seizure episode requiring ER visit, denies any hospitalization or intubation, patient had dental procedure 12/2019 with Dr. Guerrier @ [facility] using Propofol with no reactions or complications.   **** Case discussed with anesthesia via teams, Dr. Morris recommended cardiac eval and echo prior to procedure   *****12/22/2020 1520 Called spoke to group home regarding yearly cardiac evaluation date, group home awaiting call back from Dr. mccallum's office regarding appointment. Called Spoke to Dr. Sherman and discussed,  She will speak to Group home regarding this.  received call back from Dr. Sherman, she will speak to patient's cardiologist and discuss case with Dr. Morris.     ****12/22 1540 according to ,  ( cardio) office will be faxing a cardiac clearance letter to [facility]. 20 Year OLD MALE accompanied by mom and aid with PMH of Seizures ( 4 episodes last episode 11/25/2020 with ER visit followed by neuro last visit 12/3/2020), Severe Autism, Ebstein's anamoly/SVT s/p Cone procedure, right sided maze and primary PFO closure 2010, had loop recorder in the past which was removed in 2017  planned for comprehensive dental treatment under anesthesia on 12/28/2020.        ****According to mom, 2018 for dental procedure @ [facility] patient received ketamine and had a seizure episode requiring ER visit, denies any hospitalization or intubation, patient had dental procedure 12/2019 with Dr. Guerrier @ [facility] using Propofol with no reactions or complications.   **** Case discussed with anesthesia via teams, Dr. Morris recommended cardiac eval and echo prior to procedure   *****12/22/2020 1520 Called spoke to group home regarding yearly cardiac evaluation date, group home awaiting call back from Dr. mccallum's office regarding appointment. Called Spoke to Dr. Sherman and discussed,  She will speak to Group home regarding this.  received call back from Dr. Sherman, she will speak to patient's cardiologist and discuss case with Dr. Morris.      20 Year OLD MALE accompanied by mom and aid with PMH of Seizures ( 4 episodes last episode 11/25/2020 with ER visit followed by neuro last visit 12/3/2020), Severe Autism, Ebstein's anamoly/SVT s/p Cone procedure, right sided maze and primary PFO closure 2010, had loop recorder in the past which was removed in 2017  planned for comprehensive dental treatment under anesthesia on 12/28/2020.        ****According to mom, 2018 for dental procedure @ [facility] patient received ketamine and had a seizure episode requiring ER visit, denies any hospitalization or intubation, patient had dental procedure 12/2019 with Dr. Guerrier @ [facility] using Propofol with no reactions or complications.        20 Year OLD MALE accompanied aid with PMH of Seizures ( 4 episodes last episode 11/25/2020 with ER visit followed by neuro last visit 12/3/2020), Severe Autism, Ebstein's anamoly/SVT s/p Cone procedure, right sided maze and primary PFO closure 2010, had loop recorder in the past which was removed in 2017  planned for comprehensive dental treatment under anesthesia on 11/12/2021.    Denies Recent travel, Exposure or Covid symptoms  Covid test on     ****According to mom, 2018 for dental procedure @ [facility] patient received ketamine and had a seizure episode requiring ER visit, denies any hospitalization or intubation, patient had dental procedure 12/2019 with Dr. Guerrier @ [facility] using Propofol with no reactions or complications.        20 year old male  accompanied aid with hx of Seizures ( Last seizure episode 5/2021, Had 4 episodes last episode 11/25/2020), Severe Autism, Ebstein's anamoly/SVT s/p Cone procedure, right sided maze and primary PFO closure 2010, had loop recorder in the past which was removed in 2017  planned for comprehensive dental treatment under anesthesia on 11/12/2021.    Denies Recent travel, Exposure or Covid symptoms  Covid test to be scheduled.     ****According to mom, 2018 for dental procedure @ [facility] patient received ketamine and had a seizure episode requiring ER visit, denies any hospitalization or intubation, patient had dental procedure 12/2019 with Dr. Guerrier @ [facility] using Propofol with no reactions or complications.        20 year old male  accompanied by director of support personal( Young Arguelles)   with hx of Seizures ( Last seizure episode 5/2021, Had 4 episodes  11/25/2020), Severe Autism, Ebstein's anamoly/SVT s/p Cone procedure, right sided maze and primary PFO closure 2010, had loop recorder in the past which was removed in 2017  planned for comprehensive dental treatment under anesthesia on 11/12/2021.    Denies Recent travel, Exposure or Covid symptoms  Covid test to be scheduled.          20 year old male  accompanied by director of support personal( Young Arguelles)   with hx of Seizures ( Last seizure episode 5/2021, Had 4 episodes  11/25/2020), Severe Autism, Ebstein's anamoly/SVT s/p Cone procedure, right sided maze and primary PFO closure 2010, had loop recorder in the past which was removed in 2017  planned for comprehensive dental treatment under anesthesia on 11/12/2021.    Scheduled for Comprehensive dental treatment on 11/12/2021    Denies Recent travel, Exposure or Covid symptoms  Covid test to be scheduled.

## 2021-10-29 NOTE — H&P PST ADULT - OTHER CARE PROVIDERS
Cardio 1/2020,  dr. Gamino neuro [id number] last seizure 11/25/2020 last visit 12/3  Cardio ,  dr. Gamino neuro [id number] last seizure  last visit 12/3

## 2021-10-29 NOTE — H&P PST ADULT - NSICDXPASTMEDICALHX_GEN_ALL_CORE_FT
PAST MEDICAL HISTORY:  Autism severe    Dental caries     Ebstein anomaly sx 2010    Seizures onset [id number], last episode 11/25/2020 with ER visit, followed by neuro last visit 12/3/2020 as per mom

## 2021-10-29 NOTE — H&P PST ADULT - PROBLEM SELECTOR PLAN 1
Scheduled for Comprehensive dental treatment on 11/12/2021  Labs  Pre op checklist and teach back performed with Traci ( assistant supervisor)   Medical/ Cardiac/ Neuro eval

## 2021-10-29 NOTE — H&P PST ADULT - ADDITIONAL PE
unable to perform full assessment due to patient uncooperative behavior unable to perform full assessment due to patient uncooperative behavior.  unable to do vital signs

## 2021-12-17 ENCOUNTER — APPOINTMENT (OUTPATIENT)
Dept: PEDIATRIC CARDIOLOGY | Facility: CLINIC | Age: 21
End: 2021-12-17

## 2021-12-17 ENCOUNTER — NON-APPOINTMENT (OUTPATIENT)
Age: 21
End: 2021-12-17

## 2022-02-25 ENCOUNTER — OUTPATIENT (OUTPATIENT)
Dept: OUTPATIENT SERVICES | Facility: HOSPITAL | Age: 22
LOS: 1 days | End: 2022-02-25
Payer: MEDICAID

## 2022-02-25 VITALS
WEIGHT: 166.01 LBS | SYSTOLIC BLOOD PRESSURE: 109 MMHG | DIASTOLIC BLOOD PRESSURE: 69 MMHG | HEIGHT: 65 IN | TEMPERATURE: 97 F | OXYGEN SATURATION: 100 % | RESPIRATION RATE: 18 BRPM | HEART RATE: 65 BPM

## 2022-02-25 DIAGNOSIS — Z92.89 PERSONAL HISTORY OF OTHER MEDICAL TREATMENT: Chronic | ICD-10-CM

## 2022-02-25 DIAGNOSIS — K02.9 DENTAL CARIES, UNSPECIFIED: ICD-10-CM

## 2022-02-25 DIAGNOSIS — K05.6 PERIODONTAL DISEASE, UNSPECIFIED: ICD-10-CM

## 2022-02-25 DIAGNOSIS — K02.62 DENTAL CARIES ON SMOOTH SURFACE PENETRATING INTO DENTIN: ICD-10-CM

## 2022-02-25 DIAGNOSIS — Z98.890 OTHER SPECIFIED POSTPROCEDURAL STATES: Chronic | ICD-10-CM

## 2022-02-25 DIAGNOSIS — Z01.818 ENCOUNTER FOR OTHER PREPROCEDURAL EXAMINATION: ICD-10-CM

## 2022-02-25 LAB
ANION GAP SERPL CALC-SCNC: 13 MMOL/L — SIGNIFICANT CHANGE UP (ref 5–17)
BUN SERPL-MCNC: 9 MG/DL — SIGNIFICANT CHANGE UP (ref 7–23)
CALCIUM SERPL-MCNC: 9.6 MG/DL — SIGNIFICANT CHANGE UP (ref 8.4–10.5)
CHLORIDE SERPL-SCNC: 104 MMOL/L — SIGNIFICANT CHANGE UP (ref 96–108)
CO2 SERPL-SCNC: 23 MMOL/L — SIGNIFICANT CHANGE UP (ref 22–31)
CREAT SERPL-MCNC: 0.62 MG/DL — SIGNIFICANT CHANGE UP (ref 0.5–1.3)
GLUCOSE SERPL-MCNC: 97 MG/DL — SIGNIFICANT CHANGE UP (ref 70–99)
HCT VFR BLD CALC: 34.3 % — LOW (ref 39–50)
HGB BLD-MCNC: 11.5 G/DL — LOW (ref 13–17)
MCHC RBC-ENTMCNC: 32.3 PG — SIGNIFICANT CHANGE UP (ref 27–34)
MCHC RBC-ENTMCNC: 33.5 GM/DL — SIGNIFICANT CHANGE UP (ref 32–36)
MCV RBC AUTO: 96.3 FL — SIGNIFICANT CHANGE UP (ref 80–100)
NRBC # BLD: 0 /100 WBCS — SIGNIFICANT CHANGE UP (ref 0–0)
PLATELET # BLD AUTO: 162 K/UL — SIGNIFICANT CHANGE UP (ref 150–400)
POTASSIUM SERPL-MCNC: 4 MMOL/L — SIGNIFICANT CHANGE UP (ref 3.5–5.3)
POTASSIUM SERPL-SCNC: 4 MMOL/L — SIGNIFICANT CHANGE UP (ref 3.5–5.3)
RBC # BLD: 3.56 M/UL — LOW (ref 4.2–5.8)
RBC # FLD: 13 % — SIGNIFICANT CHANGE UP (ref 10.3–14.5)
SODIUM SERPL-SCNC: 140 MMOL/L — SIGNIFICANT CHANGE UP (ref 135–145)
WBC # BLD: 4.03 K/UL — SIGNIFICANT CHANGE UP (ref 3.8–10.5)
WBC # FLD AUTO: 4.03 K/UL — SIGNIFICANT CHANGE UP (ref 3.8–10.5)

## 2022-02-25 PROCEDURE — 36415 COLL VENOUS BLD VENIPUNCTURE: CPT

## 2022-02-25 PROCEDURE — G0463: CPT

## 2022-02-25 PROCEDURE — 85027 COMPLETE CBC AUTOMATED: CPT

## 2022-02-25 PROCEDURE — 80048 BASIC METABOLIC PNL TOTAL CA: CPT

## 2022-02-25 RX ORDER — ERGOCALCIFEROL 1.25 MG/1
0 CAPSULE ORAL
Qty: 0 | Refills: 0 | DISCHARGE

## 2022-02-25 RX ORDER — LANOLIN ALCOHOL/MO/W.PET/CERES
1 CREAM (GRAM) TOPICAL
Qty: 0 | Refills: 0 | DISCHARGE

## 2022-02-25 NOTE — H&P PST ADULT - PROBLEM SELECTOR PLAN 1
Periodontal Disease  Procedure: Comprehensive Dental treatment under anesthesia  -cbc, bmp done at [facility]  -medical evaluation  -instructed DSP patient to take antiepileptics and psychiatric support medications am of surgery with sips of water only  -preop covid swab to be scheduled at other [facility] facility-booking instructions provided

## 2022-02-25 NOTE — H&P PST ADULT - LAST ECHOCARDIOGRAM
Today's INR is 1.3. PT states she took Coumadin and ate green as directed. Denies any missed doses or excessive k. Denies any s/s of blood clot. Adjusted pt's dose and instructed to hold green vegs for 3 days. Recheck INR on Tuesday. Patient instructed regarding medication; results given and questions answered. Nutritional counseling given.  Dietary factors affecting therapy addressed.  Patient instructed to monitor for excessive bruising or bleeding. PT instructed to watch for any s/s of blood clot while INR is low and report an immediately to ER. PT verbalizes understanding.       This document has been electronically signed by ERIKA PanCNHERMILO-BC @  On September 5, 2019 12:42 PM    
denies

## 2022-02-25 NOTE — H&P PST ADULT - RESPIRATORY RATE (BREATHS/MIN)
Discharge Summary


Hospital Course


Date of Admission


Mar 21, 2018 at 12:32


Date of Discharge


Mar 24, 2018 at 16:00


Admitting Diagnosis


R breast Ca


Reason for Hospitalization:  elective surgery


HPI


Anna Turpin is a 54 year old female who was admitted on Mar 21, 2018 at 12:

30 for Carcinoma Right Breast and elective surgery


Procedures


s/p 3/22 by dr Kraft


Bilateral total mastectomy with right axillary


sentinel lymph node biopsy.


Hospital Course


s/p surgery


pain management


pain, initially not controlled with  Norco,  changed to Percocet, pain 

controlled


intiailly iVF


incision C/D


3 TERI drains ( 2 on the rigth and 1 on the left), output closely monitored 


patient and her daughter were taught drain care


ambulated with assistance 


tolerated diet, initially poor appetite


voided without difficulties


pathology revealed invasive ductal carcinoma, high grade


R axilla node excision- benign, no malignant cells 


BS stable


patient was stable for dc


scripts for analgesics and supplies  provided


dc instructions/limitations discussed 


reviewed care of TERI with patient and daughter 


patient to fup as outpt in clinic 3/29 





FINAL DIAGNOSES 


1. 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


breast.


2. Left breast bloody nipple discharge ( negative imaging studies and exam) 


3. Strong family history for breast cancer.


4. s/p bilateral total mastectomy with right axillary


sentinel lymph node biopsy.


5. Diabetes ( controlled with oral agents)





Discharge Medications


Continued Medications:  


Aspirin* (Aspir 81*) 81 Mg Tablet.dr


81 MG ORAL DAILY, TAB (This prescription has been renewed)





Fish Oil (Fish Oil 1,000 mg Capsule) 1 Each Capsule


1000 MG ORAL DAILY, CAP (This prescription has been renewed)





Metformin Hcl* (Metformin Hcl*) 500 Mg Tablet


500 MG ORAL TWICE A DAY, TAB (This prescription has been renewed)





Simvastatin (Zocor) 10 Mg Tablet


10 MG ORAL BEDTIME, TAB (This prescription has been renewed)











Discharge


Discharge Disposition


Patient was discharged to Home (01)





Discharge Instructions


Discharge Instructions


Special Instructions


I have been assigned to complete a D/C Summary on this account. I was not 

involved in the patient management











Deanna Iqbal NP ([place]anchte[place]) Mar 28, 2018 08:28
18

## 2022-02-25 NOTE — H&P PST ADULT - NSICDXPASTMEDICALHX_GEN_ALL_CORE_FT
PAST MEDICAL HISTORY:  Autism     Dental caries     Ebstein anomaly -sx 2010    Seizures -onset [id number], last episode 11/25/2020 with ER visit, followed by neuro last visit 12/3/2020 as per mom

## 2022-02-25 NOTE — H&P PST ADULT - HISTORY OF PRESENT ILLNESS
21 year old male with PMH Autism (non-verbal at baseline), Seizure  He presents to PST today for evaluation prior to scheduled Comprehensive Dental Treatment under Anesthesia on 3/16/2022    preop covid swab to be scheduled at other [facility] --booking instructions given to DSP 21 year old male with PMH Autism, (non-verbal at baseline), Seizure Disorder, Ebstein Anomaly, S/P Cardiac Surgery 2010 with Periodontal Disease. He presents to PST today for evaluation prior to scheduled Comprehensive Dental Treatment under Anesthesia on 3/16/2022.      preop covid swab to be scheduled at other [facility] Facility --booking instructions given to DSP

## 2022-02-25 NOTE — H&P PST ADULT - NSICDXPASTSURGICALHX_GEN_ALL_CORE_FT
PAST SURGICAL HISTORY:  H/O heart surgery 2010       History of dental surgery 2019 with Dr. Esquivel at [place]

## 2022-02-25 NOTE — H&P PST ADULT - OTHER CARE PROVIDERS
Cardiologist--Dr. Juan Jose Le, [place], [phone]//Neurologist--Dr. Iraj Hernandez, [place], [phone]

## 2022-02-25 NOTE — H&P PST ADULT - BP NONINVASIVE DIASTOLIC (MM HG)
67
Jose Jackman)  [facility]  [address] [place] [address] [place] [address]  Phone: [phone]  Fax: [phone]  Follow Up Time: 1-3 Days

## 2022-03-17 DIAGNOSIS — Z01.818 ENCOUNTER FOR OTHER PREPROCEDURAL EXAMINATION: ICD-10-CM

## 2022-04-11 PROBLEM — R56.9 UNSPECIFIED CONVULSIONS: Chronic | Status: ACTIVE | Noted: 2020-12-22

## 2022-04-11 PROBLEM — F84.0 AUTISTIC DISORDER: Chronic | Status: ACTIVE | Noted: 2020-12-22

## 2022-04-11 PROBLEM — Q22.5 EBSTEIN'S ANOMALY: Chronic | Status: ACTIVE | Noted: 2020-12-22

## 2022-04-12 NOTE — CONSULT NOTE ADULT - SUBJECTIVE AND OBJECTIVE BOX
Consult Note Peds – Presurgical– NP/Attending    Pre procedure assessment for: sedated echocardiogram  Source of information: Parent/Guardian:   PMD: Nanci Pierce [phone]  Specialists: cardiologist Juan Jose Le MD neurologist endocrinologist    ===============================================================  ALLERGIES:  Benadryl (Other)  dust (Sneezing)  ketamine (Seizure)    PAST MEDICAL & SURGICAL HISTORY:  Ebstein anomaly  -sx 2010    Seizures  -onset [id number], last episode 11/25/2020 with ER visit, followed by neuro last visit 12/3/2020 as per mom    Autism    Dental caries    H/O cardiac surgery 10/2010: cone procedure, right sided maze and primary PFO closure      History of dental surgery  2019 with Dr. Esquivel at [place]  3/16/2022 [facility]      MEDICATIONS  (STANDING):    MEDICATIONS  (PRN):      Vaccines:      ========================BIRTH HISTORY===========================  Family hx:  Mother:   Father:  Siblings:     Denies family hx of bleeding or anesthesia complications.     =======================SLEEP APNEA RISK=========================    Crowded oropharynx:  Craniofacial abnormalities affecting airway:  Patient has sleep partner:  Daytime somnolence/fatigue:  Loud snoring:  Frequent arousals/snoring choking:  HUNG category mild/moderate/severe:    ==============================TRANSFUSION HISTORY==============    Previous Blood Transfusion:  Previous Transfusion Reaction:  Premedication required:  Blood Avoidance:    HT in cm:           WT in kg:            Temp in Celsius:            Temp Site:            HR:            RR:            BP:            SpO2 %:    ======================================LABS====================      Type and Screen:    ================================DIAGNOSTIC TESTING==============  Electrocardiogram: 1/15/2020:  NSR @ 49bpm, first degree AV block, QRS =186 RBBB, non specific T wave abnormality, prolonged QTc of 466 ms due to prolonged QRS, abnormal EKG.    Echocardiogram: 1/15/2020:  1. 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  2. S/P cone procedure of Tricuspid valve with right sided Maze procedure October 2010.   3. Mild tricuspid valve regurgitation with two jets noted  4. moderately dilated right ventricle   5. mildly dilated right atrium  6. intact ventricular septum  7. dyskinetic interventricular septal motion noted  8. mild to moderate global hypokinesia of the right ventricle  9. normal left ventricular morphology and systolic function  10. mild mitral valve prolapse  12. mild pulmonary valve regurgitation  13. no pericardial effusion    Other:

## 2022-04-12 NOTE — CONSULT NOTE ADULT - CONSULT REASON
Presurgical assessment prior to sedated [place] [facility] Child not seen, unable to cancel document.

## 2022-04-12 NOTE — CONSULT NOTE ADULT - REASON FOR ADMISSION
21 year old male presents with a PMH of autism (non-verbal), Ebstein anomaly (s/p cone procedure with right sided maze procedure, primary PFO closure 10/2010), seizure disorder and hypothyroidism. He currently resides in a group home. At his last cardiology appointment 12/17/21, a visit was attempted, but the patient was uncooperative. Vital signs and examination were not able to be obtained. He is now scheduled for sedated echocardiogram, EKG, and exam under anesthesia.

## 2022-04-12 NOTE — CONSULT NOTE ADULT - NEUROLOGICAL COMMENTS
h/o seizure disorder onset 2017/2018, maintained on divalproex sodium (Depakote), Last seizure 11/25/2020 with ER visit, followed by neuro, last visit 12/3/2020. H/o severe autism, difficulty at medical appointments. Maintained on lorazepam, aripiprazole, and risperidone.

## 2022-04-18 ENCOUNTER — OUTPATIENT (OUTPATIENT)
Dept: OUTPATIENT SERVICES | Age: 22
LOS: 1 days | End: 2022-04-18

## 2022-04-18 VITALS
HEART RATE: 103 BPM | RESPIRATION RATE: 17 BRPM | TEMPERATURE: 98 F | WEIGHT: 163.14 LBS | OXYGEN SATURATION: 97 % | HEIGHT: 66.14 IN

## 2022-04-18 DIAGNOSIS — Q22.5 EBSTEIN'S ANOMALY: ICD-10-CM

## 2022-04-18 DIAGNOSIS — Z92.89 PERSONAL HISTORY OF OTHER MEDICAL TREATMENT: Chronic | ICD-10-CM

## 2022-04-18 DIAGNOSIS — Z98.890 OTHER SPECIFIED POSTPROCEDURAL STATES: Chronic | ICD-10-CM

## 2022-04-18 DIAGNOSIS — F84.0 AUTISTIC DISORDER: ICD-10-CM

## 2022-04-18 NOTE — CONSULT NOTE ADULT - ASSESSMENT
21 year old male presents for presurgical evaluation.   No evidence of acute illness or infection.   No known personal or family h/o adverse reactions to anesthesia or hemostasis issues.   No contraindications noted for procedure as scheduled.   COVID  Parent aware to notify PCP and surgeon if child develops s/sx of illness or infection prior to DOS.  21 year old male presents for presurgical evaluation.   No evidence of acute illness or infection.   No known personal or family h/o adverse reactions to anesthesia or hemostasis issues.   No contraindications noted for procedure as scheduled.   COVID PCR obtained 4/18/22 at [facility]   aware to notify PCP and surgeon if child develops s/sx of illness or infection prior to DOS.   *Per Dr. Nagel pediatric cardiac anesthesia, no contraindications for patient to be premedicated with Valium and Vistaril prior to appointment.   21 year old male presents for presurgical evaluation.   No evidence of acute illness or infection.   No known personal or family h/o adverse reactions to anesthesia or hemostasis issues.   No contraindications noted for procedure as scheduled.   COVID PCR obtained 4/18/22 at [facility]   aware to notify PCP and surgeon if child develops s/sx of illness or infection prior to DOS.   *Per Dr. Nagel pediatric cardiac anesthesia, no contraindications for patient to be premedicated with Valium and Vistaril prior to appointment.    Mother will be present on DOS. Interview done with mother (Sharri Samuels) and Mirna Garcia,  via telephone.

## 2022-04-18 NOTE — CONSULT NOTE ADULT - GENERAL COMMENTS
Denies fever, runny nose, cough, congestion, V/D in the past 2 weeks.
Denies fever, runny nose, cough, congestion, V/D in the past 2 weeks.

## 2022-04-18 NOTE — CONSULT NOTE ADULT - ENDOCRINE COMMENTS
h/o hypothyroidism h/o hypothyroidism newly diagnosed at during recent hospitalization, TSH  5.58, started on levothyroxine 0.025mcg, followed by PMD

## 2022-04-18 NOTE — CONSULT NOTE ADULT - PROBLEM SELECTOR RECOMMENDATION 9
Plan for procedure as scheduled sedated echocardiogram on 4/21/22 at [facility] [place] [place].

## 2022-04-18 NOTE — CONSULT NOTE ADULT - REASON FOR ADMISSION
21 year old male presents with a PMH of autism (non-verbal), Ebstein anomaly (s/p cone procedure with right sided maze procedure, primary PFO closure 10/2010, seizure disorder and hypothyroidism. He currently resides in a group home. At his last cardiology appointment 12/17/21 a visit was attempted, but he was uncooperative. Vital signs and examination were not able to be obtained. He is now scheduled for sedated echocardiogram, EKG and exam under anesthesia. 21 year old male presents with a PMH of autism (non-verbal), Ebstein anomaly (s/p cone procedure with right sided maze procedure, primary PFO closure 10/2010, seizure disorder and hypothyroidism. He currently resides in a group home. At his last cardiology appointment 12/17/21 a visit was attempted, but he was uncooperative. Vital signs and examination were not able to be obtained. He is now scheduled for sedated echocardiogram, EKG and exam under anesthesia.  Of note, patient lives in a group home. 21 year old male presents with a PMH of autism (non-verbal), Ebstein anomaly (s/p cone procedure with right sided maze procedure, primary PFO closure 10/2010, seizure disorder and hypothyroidism. He currently resides in a group home. At his last cardiology appointment 12/17/21 a visit was attempted, but he was uncooperative. Vital signs and examination were not able to be obtained. He is now scheduled for sedated echocardiogram, EKG and exam under anesthesia.  Of note, patient lives in a group home. He gets premedicated for medical appointments with Valium 5mg and Vistaril 25mg.

## 2022-04-18 NOTE — CONSULT NOTE ADULT - ENMT COMMENTS
h/o periodontal disease with dental work under anesthesia, most recently 3/16/22 w/o complications + dental caries, absent left upper central incisor

## 2022-04-18 NOTE — CONSULT NOTE ADULT - NEUROLOGICAL COMMENTS
h/o seizure disorder, onset 2017/2018, maintained on divalproex sodium. Last seizure 11/25/2020 with ER visit followed by neuro. last visit 12/3/2020. h/o seizure disorder, onset 2017/2018, maintained on divalproex sodium. Last seizure 2/8/22, [place] [facility] [place] [facility] [place] 2 [facility], followed with Lesa Hernandez MD, last seen via telemedicine after seizure. Typical seizure is tonic clonic seizure.

## 2022-04-18 NOTE — CONSULT NOTE ADULT - SUBJECTIVE AND OBJECTIVE BOX
Consult Note Peds – Presurgical– NP/Attending    Pre procedure assessment for: sedated echocardiogram  Source of information: Parent/Guardian:   PMD: Nanci Pierce [phone]  Specialists: cardiologist Juan Jose Le MD, neurologist Iraj Hernandez MD, endocrinologist     ===============================================================  Benadryl (Other)  dust (Sneezing)  ketamine (Other)    PAST MEDICAL & SURGICAL HISTORY:  Ebstein anomaly  -sx 2010    Seizures  -onset 3048-9644, last episode 11/25/2020 with ER visit, followed by neuro last visit 12/3/2020 as per mom    Autism    Dental caries    H/O cardiac surgery 10/2010: cone procedure, right sided Maze and primary PFO closure      History of dental surgery  2019 with Dr. Esquivel at [place]  3/16/22 [facility]      MEDICATIONS  (STANDING):    MEDICATIONS  (PRN):      Vaccines:      ========================BIRTH HISTORY===========================    Birth Weight:   Gestational Age    Family hx:  Mother:   Father:  Siblings:     Denies family hx of bleeding or anesthesia complications.     =======================SLEEP APNEA RISK=========================    Crowded oropharynx:  Craniofacial abnormalities affecting airway:  Patient has sleep partner:  Daytime somnolence/fatigue:  Loud snoring:  Frequent arousals/snoring choking:  HUNG category mild/moderate/severe:    ==============================TRANSFUSION HISTORY==============    Previous Blood Transfusion:  Previous Transfusion Reaction:  Premedication required:  Blood Avoidance:    ======================================LABS====================      Type and Screen:    ================================DIAGNOSTIC TESTING==============  Electrocardiogram: 1/15/2020:   NSR @ 49 bpm, first degree AV block, QRS = 186, RBBB, non specific T wave abnormality, prolonged QTc of 466 ms due to prolonged QRS, abnormal EKG    Echocardiogram: 1/15/2020:  1. 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 S/P cone procedure of Tricuspid valve with right sided Maze procedure October 2010.  3. Mild tricuspid valve regurgitation with two jets noted  4. moderately dilated right ventricle  5. mildly dilated right atrium  6. intact ventricular septum  7. dyskinetic interventricular septal motion noted   8. mild to moderate global hypokinesia of the right ventricle  9. normal left ventricular morphology and systolic function  10. mild mitral valve prolapse  11. mild pulmonary valve regurgitation  12. no pericardial effusion      HT in cm:           WT in kg:            Temp in Celsius:            Temp Site:            HR:            RR:            BP:            SpO2 %:         Consult Note Peds – Presurgical– NP/Attending    Pre procedure assessment for: sedated echocardiogram  Source of information: Parent/Guardian: Isaac (house aid)  PMD: Nanci Pierce [phone]  Specialists: cardiologist Juan Jose Le MD, neurologist Iraj Hernandez MD, endocrinologist     ===============================================================  Benadryl (Other)  dust (Sneezing)  ketamine (Other)    PAST MEDICAL & SURGICAL HISTORY:  Ebstein anomaly  -sx 2010    Seizures  -onset 4920-1051, last episode 2020 with ER visit, followed by neuro last visit 12/3/2020 as per mom    Autism    Dental caries    H/O cardiac surgery 10/2010: cone procedure, right sided Maze and primary PFO closure      History of dental surgery  2019 with Dr. Esquivel at [place]  3/16/22 [facility]      MEDICATIONS  (STANDING):  lorazepam 2mg 1 tab orally 3 times a day  aripiprazole 5mg 0.5 tabs orally BID at 8am and 1pm  divalproex sodium 125mg oral delayed release tablet 6 caps orally once a day in the morning  risperidone 1mg/1mL oral solution 3 mL orally BID at 1pm and 8pm  risperidone 1mg/1mL oral solution 2.5mL orally once a day at 8am  centrum chewables  melatonin 1mg/1mL oral solution 5mL orally once a day at bedtime  divalproex sodium 125 mg oral delayed release tablet 5 caps orally once a day at bedtime    MEDICATIONS  (PRN):      Vaccines:  UTD on vaccines.   Denies vaccines in the past 2 weeks.   Denies travel outside the [place] in the past 2 weeks.   Denies known exposure to COVID in the past 2 weeks.   COVID test       ========================BIRTH HISTORY===========================    Family hx:  Mother:   Father:  Siblings:     Denies family hx of bleeding or anesthesia complications.     =======================SLEEP APNEA RISK=========================    Crowded oropharynx: no  Craniofacial abnormalities affecting airway: no  Patient has sleep partner: no  Daytime somnolence/fatigue: no  Loud snoring: no  Frequent arousals/snoring choking: no  HUNG category mild/moderate/severe:    ==============================TRANSFUSION HISTORY==============    Previous Blood Transfusion:  Previous Transfusion Reaction:  Premedication required:  Blood Avoidance:    ======================================LABS====================    no labs indicated for procedure    ================================DIAGNOSTIC TESTING==============  Electrocardiogram: 1/15/2020:   NSR @ 49 bpm, first degree AV block, QRS = 186, RBBB, non specific T wave abnormality, prolonged QTc of 466 ms due to prolonged QRS, abnormal EKG    Echocardiogram: 1/15/2020:  1. 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 S/P cone procedure of Tricuspid valve with right sided Maze procedure 2010.  3. Mild tricuspid valve regurgitation with two jets noted  4. moderately dilated right ventricle  5. mildly dilated right atrium  6. intact ventricular septum  7. dyskinetic interventricular septal motion noted   8. mild to moderate global hypokinesia of the right ventricle  9. normal left ventricular morphology and systolic function  10. mild mitral valve prolapse  11. mild pulmonary valve regurgitation  12. no pericardial effusion      HT in cm: 168           WT in k          Temp in Celsius:  36.8          Temp Site:  temporal          HR: 103           RR: 17           BP: unable to obtain        SpO2 %: 97         Consult Note Peds – Presurgical– NP/Attending    Pre procedure assessment for: sedated echocardiogram  Source of information: Parent/Guardian: Isaac (house aid)  PMD: Nanci Pierce [phone]  Specialists: cardiologist Juan Jose Le MD, neurologist Iraj Hernandez MD    ===============================================================  Benadryl- hyperactivity  dust (Sneezing)  ketamine- seizures    PAST MEDICAL & SURGICAL HISTORY:  Ebstein anomaly  -sx 2010    Seizures  -onset 1243-1783, last episode 22 with ER visit to [facility]    Autism    Dental caries    H/O cardiac surgery 10/2010: cone procedure, right sided Maze and primary PFO closure      History of dental surgery  2019 with Dr. Esquivel at [place]  3/16/22 [facility]      MEDICATIONS  (STANDING):  lorazepam 2mg 1 tab orally 3 times a day  aripiprazole 5mg 0.5 tabs orally BID at 8am and 1pm  divalproex sodium 125mg oral delayed release tablet 6 caps orally once a day in the morning at 8 am and 5 at 8pm  risperidone 1mg/1mL oral solution 3 mL orally BID at 1pm and 8pm  risperidone 1mg/1mL oral solution 2.5mL orally once a day at 8am  centrum chewables  melatonin 1mg/1mL oral solution 5mL orally once a day at bedtime  Probiotics  Vibramycin 100mg BID  FeSO4 325mg one tab every other day  Citracal slow release 1200mg once daily  Vitamin C with Fe 500mg every other day      MEDICATIONS  (PRN):      Vaccines:  UTD on vaccines.   Denies vaccines in the past 2 weeks.   Denies travel outside the [place] in the past 2 weeks.   Denies known exposure to COVID in the past 2 weeks.   COVID test       ========================BIRTH HISTORY===========================    Family hx:  Mother:   Father:  Siblings:     Denies family hx of bleeding or anesthesia complications.     =======================SLEEP APNEA RISK=========================    Crowded oropharynx: no  Craniofacial abnormalities affecting airway: no  Patient has sleep partner: no  Daytime somnolence/fatigue: no  Loud snoring: no  Frequent arousals/snoring choking: no  HUNG category mild/moderate/severe:    ==============================TRANSFUSION HISTORY==============    Previous Blood Transfusion:  Previous Transfusion Reaction:  Premedication required:  Blood Avoidance:    ======================================LABS====================    no labs indicated for procedure    ================================DIAGNOSTIC TESTING==============  Electrocardiogram: 1/15/2020:   NSR @ 49 bpm, first degree AV block, QRS = 186, RBBB, non specific T wave abnormality, prolonged QTc of 466 ms due to prolonged QRS, abnormal EKG    Echocardiogram: 1/15/2020:  1. 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 S/P cone procedure of Tricuspid valve with right sided Maze procedure 2010.  3. Mild tricuspid valve regurgitation with two jets noted  4. moderately dilated right ventricle  5. mildly dilated right atrium  6. intact ventricular septum  7. dyskinetic interventricular septal motion noted   8. mild to moderate global hypokinesia of the right ventricle  9. normal left ventricular morphology and systolic function  10. mild mitral valve prolapse  11. mild pulmonary valve regurgitation  12. no pericardial effusion      HT in cm: 168           WT in k          Temp in Celsius:  36.8          Temp Site:  temporal          HR: 103           RR: 17           BP: unable to obtain        SpO2 %: 97         Consult Note Peds – Presurgical– NP/Attending    Pre procedure assessment for: sedated echocardiogram  Source of information: Parent/Guardian: Isaac (house aid)  PMD: Nanci Pierce [phone]  Specialists: cardiologist Juan Jose Le MD, neurologist Iraj Hernandez MD    ===============================================================  Benadryl- hyperactivity  dust (Sneezing)  ketamine- seizures    PAST MEDICAL & SURGICAL HISTORY:  Ebstein anomaly  -sx 2010    Seizures  -onset 3027-5675, last episode 22 with ER visit to [facility]    Autism    Dental caries    H/O cardiac surgery 10/2010: cone procedure, right sided Maze and primary PFO closure      History of dental surgery  2019 with Dr. Esquivel at [place]  3/16/22 [facility]      MEDICATIONS  (STANDING):  lorazepam 2mg 1 tab orally 3 times a day  aripiprazole 5mg 0.5 tabs orally BID at 8am and 1pm  divalproex sodium 125mg oral delayed release tablet 6 caps orally once a day in the morning at 8 am and 5 at 8pm  risperidone 1mg/1mL oral solution 3 mL orally BID at 1pm and 8pm  risperidone 1mg/1mL oral solution 2.5mL orally once a day at 8am  centrum chewables  melatonin 1mg/1mL oral solution 5mL orally once a day at bedtime  Probiotics  Vibramycin 100mg BID  FeSO4 325mg one tab every other day  Citracal slow release 1200mg once daily  Vitamin C with Fe 500mg every other day  Levothyroxine 0/025mcg once daily      MEDICATIONS  (PRN):      Vaccines:  UTD on vaccines.   Denies vaccines in the past 2 weeks.   Denies travel outside the [place] in the past 2 weeks.   Denies known exposure to COVID in the past 2 weeks.   COVID [facility] obtained on 22 at [facility]      ========================BIRTH HISTORY===========================    Family hx:  Mother:  x 2  Father: no pmh, no psh  Siblings: sister 18y/o: no pmh, no psh  brother: no pmh, no psh  Denies known family h/o adverse reactions to anesthesia or hemostasis issues.       =======================SLEEP APNEA RISK=========================    Crowded oropharynx: no  Craniofacial abnormalities affecting airway: no  Patient has sleep partner: no  Daytime somnolence/fatigue: no  Loud snoring: no  Frequent arousals/snoring choking: no  HUNG category mild/moderate/severe:    ======================================LABS====================    no labs indicated for procedure    ================================DIAGNOSTIC TESTING==============  Electrocardiogram: 1/15/2020:   NSR @ 49 bpm, first degree AV block, QRS = 186, RBBB, non specific T wave abnormality, prolonged QTc of 466 ms due to prolonged QRS, abnormal EKG    Echocardiogram: 1/15/2020:  1. 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 S/P cone procedure of Tricuspid valve with right sided Maze procedure 2010.  3. Mild tricuspid valve regurgitation with two jets noted  4. moderately dilated right ventricle  5. mildly dilated right atrium  6. intact ventricular septum  7. dyskinetic interventricular septal motion noted   8. mild to moderate global hypokinesia of the right ventricle  9. normal left ventricular morphology and systolic function  10. mild mitral valve prolapse  11. mild pulmonary valve regurgitation  12. no pericardial effusion      HT in cm: 168           WT in k          Temp in Celsius:  36.8          Temp Site:  temporal          HR: 103           RR: 17           BP: unable to obtain        SpO2 %: 97

## 2022-04-18 NOTE — CONSULT NOTE ADULT - CONSULT REASON
Presurgical [place] prior to sedated [place] [facility]

## 2022-04-18 NOTE — CONSULT NOTE ADULT - RESPIRATORY AND THORAX COMMENTS
Denies h/o asthma, use of abuterol/inhaled/oral steroids.
Denies h/o asthma, use of albuterol/inhaled/oral steroids.

## 2022-04-18 NOTE — CONSULT NOTE ADULT - PSYCHIATRIC COMMENTS
h/o severe autism, patient non-verbal and has outbursts, maintained on risperidone, aripiprazole and lorazepam agitated, screaming h/o severe autism, patient non-verbal and has outbursts, head butts frequently as means of communication, maintained on risperidone, aripiprazole and lorazepam, follows with Dr. Raiza Dubois

## 2022-04-20 ENCOUNTER — OUTPATIENT (OUTPATIENT)
Dept: OUTPATIENT SERVICES | Age: 22
LOS: 1 days | Discharge: HOME | End: 2022-04-20

## 2022-04-20 DIAGNOSIS — Z98.890 OTHER SPECIFIED POSTPROCEDURAL STATES: Chronic | ICD-10-CM

## 2022-04-20 DIAGNOSIS — R56.9 UNSPECIFIED CONVULSIONS: ICD-10-CM

## 2022-04-20 DIAGNOSIS — Z92.89 PERSONAL HISTORY OF OTHER MEDICAL TREATMENT: Chronic | ICD-10-CM

## 2022-04-20 LAB — SARS-COV-2 N GENE NPH QL NAA+PROBE: NOT DETECTED

## 2022-04-21 ENCOUNTER — TRANSCRIPTION ENCOUNTER (OUTPATIENT)
Age: 22
End: 2022-04-21

## 2022-04-21 ENCOUNTER — OUTPATIENT (OUTPATIENT)
Dept: OUTPATIENT SERVICES | Age: 22
LOS: 1 days | End: 2022-04-21

## 2022-04-21 ENCOUNTER — APPOINTMENT (OUTPATIENT)
Dept: PEDIATRIC CARDIOLOGY | Facility: CLINIC | Age: 22
End: 2022-04-21
Payer: MEDICAID

## 2022-04-21 VITALS
SYSTOLIC BLOOD PRESSURE: 105 MMHG | DIASTOLIC BLOOD PRESSURE: 60 MMHG | TEMPERATURE: 98 F | WEIGHT: 163.14 LBS | RESPIRATION RATE: 16 BRPM | HEART RATE: 53 BPM | OXYGEN SATURATION: 99 %

## 2022-04-21 VITALS
HEART RATE: 62 BPM | RESPIRATION RATE: 15 BRPM | SYSTOLIC BLOOD PRESSURE: 98 MMHG | OXYGEN SATURATION: 100 % | DIASTOLIC BLOOD PRESSURE: 41 MMHG

## 2022-04-21 DIAGNOSIS — Z92.89 PERSONAL HISTORY OF OTHER MEDICAL TREATMENT: Chronic | ICD-10-CM

## 2022-04-21 DIAGNOSIS — Z98.890 OTHER SPECIFIED POSTPROCEDURAL STATES: Chronic | ICD-10-CM

## 2022-04-21 DIAGNOSIS — Q22.5 EBSTEIN'S ANOMALY: ICD-10-CM

## 2022-04-21 PROCEDURE — 93325 DOPPLER ECHO COLOR FLOW MAPG: CPT

## 2022-04-21 PROCEDURE — 93010 ELECTROCARDIOGRAM REPORT: CPT

## 2022-04-21 PROCEDURE — 93320 DOPPLER ECHO COMPLETE: CPT

## 2022-04-21 PROCEDURE — 93303 ECHO TRANSTHORACIC: CPT

## 2022-04-21 NOTE — ASU PATIENT PROFILE, ADULT - PRO ARRIVE FROM
Pt able to get contact lenses out with serum drop use, but plunger not sticking to lens now with serum drop use    Pt would like to know if has something in serum drops preventing    Pt also states has several other questions for Dr. Anthony    Reviewed updating her mychart for communication and offered earlier evaluation than January 14th and pt states prefers call from Dr. Anthony    Reviewed Dr. Anthony out til next week and will forward to her for callback per request    Pt verbally demonstrated understanding    Haider Webber RN 5:17 PM 12/29/20          [facility]    Phone Message    May a detailed message be left on voicemail: yes     Reason for Call: Other: Pt Recieved Blood Eye drops, Dr Ocampo wanted Pt to fill contact recpeticle with said eyedrops, but cannot get contacts to suction after doing so. Please call Pt back ASAP to discuss. Thanks!     Action Taken: Message routed to:  Other: [facility]    Travel Screening: Not Applicable                                                                         group home

## 2022-04-21 NOTE — ASU DISCHARGE PLAN (ADULT/PEDIATRIC) - NS MD DC FALL RISK RISK
For information on Fall & Injury Prevention, visit: [email or web address] OR  [email or web address] OR  https://www.cdc.gov/steadi/patient.html

## 2022-04-21 NOTE — ASU PATIENT PROFILE, ADULT - FALL HARM RISK - UNIVERSAL INTERVENTIONS
Bed in lowest position, wheels locked, appropriate side rails in place/Call bell, personal items and telephone in reach/Instruct patient to call for assistance before getting out of bed or chair/Non-slip footwear when patient is out of bed/[place] to call system/Physically safe environment - no spills, clutter or unnecessary equipment/Purposeful Proactive Rounding/Room/bathroom lighting operational, light cord in reach

## 2022-04-21 NOTE — ASU DISCHARGE PLAN (ADULT/PEDIATRIC) - CARE PROVIDER_API CALL
Verna Michaud)  Pediatric Cardiology; Pediatrics  [address] [place] [address], [address]  [place] [address]  Phone: [phone]  Fax: [phone]  Established Patient  Follow Up Time:

## 2022-04-21 NOTE — ASU PATIENT PROFILE, ADULT - NSICDXPASTMEDICALHX_GEN_ALL_CORE_FT
PAST MEDICAL HISTORY:  Autism     Dental caries     Ebstein anomaly -sx 2010    Seizures -onset 6815-9664, last episode 11/25/2020 with ER visit, followed by neuro last visit 12/3/2020 as per mom

## 2022-04-21 NOTE — ASU PATIENT PROFILE, ADULT - NSICDXPASTSURGICALHX_GEN_ALL_CORE_FT
PAST SURGICAL HISTORY:  H/O heart surgery 2010       History of dental surgery 2019 with Dr. Esquivel at [place]

## 2022-07-25 ENCOUNTER — APPOINTMENT (OUTPATIENT)
Dept: DERMATOLOGY | Facility: CLINIC | Age: 22
End: 2022-07-25

## 2022-07-25 PROCEDURE — 99203 OFFICE O/P NEW LOW 30 MIN: CPT

## 2022-07-25 NOTE — ASSESSMENT
[Use of independent historian: [ enter independent historian's relationship to patient ] :____] : As the patient was unable to provide a complete and reliable history, I obtained clinical history from the patient’s [unfilled] [FreeTextEntry1] : Acne vulgaris-severe on face

## 2022-07-25 NOTE — HISTORY OF PRESENT ILLNESS
[FreeTextEntry1] : Acne [de-identified] : First visit for 21-year-old mentally challenged white male with a history of acne on the face and back. Apparently treated elsewhere by Nikolay Daniels MD with doxycycline 100 mg p.o. b.i.d.\par Also using Benzamycin once a day and tazarotene cream 0.1% applied at night 3 nights per week.

## 2022-07-25 NOTE — PHYSICAL EXAM
[Alert] : alert [Oriented x 3] : ~L oriented x 3 [Well Nourished] : well nourished [FreeTextEntry3] : Face: Moderate multiple papules and pustules\par Back: Mild to moderate papules

## 2022-08-31 ENCOUNTER — OUTPATIENT (OUTPATIENT)
Dept: OUTPATIENT SERVICES | Facility: HOSPITAL | Age: 22
LOS: 1 days | End: 2022-08-31
Payer: MEDICAID

## 2022-08-31 VITALS
DIASTOLIC BLOOD PRESSURE: 76 MMHG | OXYGEN SATURATION: 100 % | RESPIRATION RATE: 18 BRPM | HEART RATE: 95 BPM | SYSTOLIC BLOOD PRESSURE: 118 MMHG | TEMPERATURE: 97 F | HEIGHT: 66.93 IN | WEIGHT: 171.96 LBS

## 2022-08-31 DIAGNOSIS — K02.62 DENTAL CARIES ON SMOOTH SURFACE PENETRATING INTO DENTIN: ICD-10-CM

## 2022-08-31 DIAGNOSIS — K02.9 DENTAL CARIES, UNSPECIFIED: ICD-10-CM

## 2022-08-31 DIAGNOSIS — Z98.890 OTHER SPECIFIED POSTPROCEDURAL STATES: Chronic | ICD-10-CM

## 2022-08-31 DIAGNOSIS — Z92.89 PERSONAL HISTORY OF OTHER MEDICAL TREATMENT: Chronic | ICD-10-CM

## 2022-08-31 DIAGNOSIS — Z01.818 ENCOUNTER FOR OTHER PREPROCEDURAL EXAMINATION: ICD-10-CM

## 2022-08-31 DIAGNOSIS — K05.6 PERIODONTAL DISEASE, UNSPECIFIED: ICD-10-CM

## 2022-08-31 LAB
ANION GAP SERPL CALC-SCNC: 17 MMOL/L — SIGNIFICANT CHANGE UP (ref 5–17)
BUN SERPL-MCNC: 14 MG/DL — SIGNIFICANT CHANGE UP (ref 7–23)
CALCIUM SERPL-MCNC: 9.7 MG/DL — SIGNIFICANT CHANGE UP (ref 8.4–10.5)
CHLORIDE SERPL-SCNC: 103 MMOL/L — SIGNIFICANT CHANGE UP (ref 96–108)
CO2 SERPL-SCNC: 26 MMOL/L — SIGNIFICANT CHANGE UP (ref 22–31)
CREAT SERPL-MCNC: 0.86 MG/DL — SIGNIFICANT CHANGE UP (ref 0.5–1.3)
EGFR: 125 ML/MIN/1.73M2 — SIGNIFICANT CHANGE UP
GLUCOSE SERPL-MCNC: 113 MG/DL — HIGH (ref 70–99)
HCT VFR BLD CALC: 38.4 % — LOW (ref 39–50)
HGB BLD-MCNC: 12.8 G/DL — LOW (ref 13–17)
MCHC RBC-ENTMCNC: 33.1 PG — SIGNIFICANT CHANGE UP (ref 27–34)
MCHC RBC-ENTMCNC: 33.3 GM/DL — SIGNIFICANT CHANGE UP (ref 32–36)
MCV RBC AUTO: 99.2 FL — SIGNIFICANT CHANGE UP (ref 80–100)
NRBC # BLD: 0 /100 WBCS — SIGNIFICANT CHANGE UP (ref 0–0)
PLATELET # BLD AUTO: 173 K/UL — SIGNIFICANT CHANGE UP (ref 150–400)
POTASSIUM SERPL-MCNC: 4 MMOL/L — SIGNIFICANT CHANGE UP (ref 3.5–5.3)
POTASSIUM SERPL-SCNC: 4 MMOL/L — SIGNIFICANT CHANGE UP (ref 3.5–5.3)
RBC # BLD: 3.87 M/UL — LOW (ref 4.2–5.8)
RBC # FLD: 12.1 % — SIGNIFICANT CHANGE UP (ref 10.3–14.5)
SODIUM SERPL-SCNC: 146 MMOL/L — HIGH (ref 135–145)
WBC # BLD: 4.58 K/UL — SIGNIFICANT CHANGE UP (ref 3.8–10.5)
WBC # FLD AUTO: 4.58 K/UL — SIGNIFICANT CHANGE UP (ref 3.8–10.5)

## 2022-08-31 PROCEDURE — 80048 BASIC METABOLIC PNL TOTAL CA: CPT

## 2022-08-31 PROCEDURE — 85027 COMPLETE CBC AUTOMATED: CPT

## 2022-08-31 PROCEDURE — G0463: CPT

## 2022-08-31 RX ORDER — DIVALPROEX SODIUM 500 MG/1
5 TABLET, DELAYED RELEASE ORAL
Qty: 0 | Refills: 0 | DISCHARGE

## 2022-08-31 RX ORDER — DIVALPROEX SODIUM 500 MG/1
6 TABLET, DELAYED RELEASE ORAL
Qty: 0 | Refills: 0 | DISCHARGE

## 2022-08-31 NOTE — H&P PST ADULT - NS PRO PASSIVE SMOKE EXP
No [Fever] : no fever [Headache] : no headache [Recent Weight Gain (___ Lbs)] : no recent weight gain [Chills] : no chills [Feeling Fatigued] : not feeling fatigued [Recent Weight Loss (___ Lbs)] : no recent weight loss [Blurry Vision] : no blurred vision [Seeing Double (Diplopia)] : no diplopia [Eye Pain] : no eye pain [Eyeglasses] : currently wearing eyeglasses [Earache] : no earache [Discharge From The Ears] : no discharge from the ears [Loss Of Hearing] : no hearing loss [Sore Throat] : no sore throat [Sinus Pressure] : no sinus pressure [Shortness Of Breath] : no shortness of breath [Dyspnea on exertion] : not dyspnea during exertion [Chest  Pressure] : no chest pressure [Chest Pain] : no chest pain [Lower Ext Edema] : no extremity edema [Leg Claudication] : no intermittent leg claudication [Palpitations] : palpitations [Cough] : no cough [Wheezing] : no wheezing [Coughing Up Blood] : no hemoptysis [Abdominal Pain] : no abdominal pain [Nausea] : no nausea [Vomiting] : no vomiting [Heartburn] : no heartburn [Change in Appetite] : no change in appetite [Change In The Stool] : no change in stool [Dysphagia] : no dysphagia [Dysuria] : no dysuria [Incontinence] : no incontinence [Vaginal Discharge] : no vaginal discharge [Joint Pain] : joint pain [Joint Swelling] : no joint swelling [Muscle Cramps] : no muscle cramps [Skin: A Rash] : no rash: [Itching] : no itching [Skin Lesions] : no skin lesions [Dizziness] : no dizziness [Tremor] : no tremor was seen [Numbness (Hypesthesia)] : no numbness [Convulsions] : no convulsions [Tingling (Paresthesia)] : no tingling [Confusion] : no confusion was observed [Memory Lapses Or Loss] : no memory lapses or loss [Anxiety] : no anxiety [Under Stress] : not under stress [Excessive Thirst] : no polydipsia [Easy Bleeding] : no tendency for easy bleeding [Swollen Glands] : no swollen glands [Easy Bruising] : no tendency for easy bruising [Swollen Glands In The Neck] : no swollen glands in the neck

## 2022-08-31 NOTE — H&P PST ADULT - HISTORY OF PRESENT ILLNESS
This is a 21 year old male with past medical history of Autism (non-verbal at baseline), Seizure Disorder (last seizure, Ebstein Anomaly, S/P Cardiac Surgery 2010 with Periodontal Disease. Previous dental treatment done on 3/16/2022. He presents to [facility] today for evaluation prior to scheduled Comprehensive Dental Treatment under Anesthesia on 9/7/22 with Dr. Sherman    Spoke to Clemencia bhakta   **   **Legal guardian- Mother, to give telephone consent  **Covid swab on 9/5 @ [facility]  **Denies any history of Covid   ** Covid vaccinated     This is a 21 year old male with past medical history of Autism (non-verbal at baseline), Seizure Disorder (last seizure April 2022 and followed up with Neurology in July 2022), Ebstein Anomaly, S/P Cardiac Surgery 2010 with Periodontal Disease. Previous dental treatment done on 3/16/2022. He presents to [facility] today for evaluation prior to scheduled Comprehensive Dental Treatment under Anesthesia on 9/7/22 with Dr. Sherman. Spoke to [facility]  [phone], who helped contribute to this history.     **Legal guardian- Mother, to give telephone consent  **Covid swab on 9/5 @ [facility]  **Denies any history of Covid   ** Covid Unvaccinated- Refused by mother     This is a 21 year old male with past medical history of Autism (non-verbal at baseline), Seizure Disorder (last seizure April 2022 and followed up with Neurology in July 2022), Ebstein Anomaly repair, S/P Cardiac Surgery 2010 with Periodontal Disease. Previous dental treatment done on 3/16/2022. He presents to [facility] today for evaluation prior to scheduled Comprehensive Dental Treatment under Anesthesia on 9/7/22 with Dr. Sherman. Spoke to [facility]  [phone], who helped contribute to this history.     **Legal guardian- Mother, to give telephone consent  **Covid swab on 9/5 @ [facility]  **Denies any history of Covid   ** Covid Unvaccinated- Refused by mother

## 2022-08-31 NOTE — H&P PST ADULT - PROBLEM SELECTOR PLAN 1
Scheduled for Comprehensive dental treatment under anesthesia on 9/7/22  Medical and cardiac clearance on chart  Preop instructions given to group home RN. Instructed to give anti-epilepsy medications with sip of water on DOS. Not to be given with any food like applesauce/ pudding  Labs CBC BMP performed in PST  Covid test on 9/5 @ [facility]

## 2022-08-31 NOTE — H&P PST ADULT - OTHER CARE PROVIDERS
Cardiologist--Dr. Juan Jose Le, [place], [phone]//Neurologist--Dr. Iraj Hernandez, [place], [phone] Cardiologist- [facility] /Neurologist--Dr. Iraj Hernandez, [place], [phone]

## 2022-08-31 NOTE — H&P PST ADULT - NSICDXPASTMEDICALHX_GEN_ALL_CORE_FT
PAST MEDICAL HISTORY:  Autism     Dental caries     Ebstein anomaly -sx 2010    Seizures -onset 5838-7497, last episode 11/25/2020 with ER visit, followed by neuro last visit 12/3/2020 as per mom

## 2022-08-31 NOTE — H&P PST ADULT - PRIMARY CARE PROVIDER
[facility] [phone] - medical clearance [facility] [phone]

## 2022-09-05 ENCOUNTER — OUTPATIENT (OUTPATIENT)
Dept: OUTPATIENT SERVICES | Facility: HOSPITAL | Age: 22
LOS: 1 days | End: 2022-09-05
Payer: MEDICAID

## 2022-09-05 DIAGNOSIS — Z98.890 OTHER SPECIFIED POSTPROCEDURAL STATES: Chronic | ICD-10-CM

## 2022-09-05 DIAGNOSIS — Z11.52 ENCOUNTER FOR SCREENING FOR COVID-19: ICD-10-CM

## 2022-09-05 DIAGNOSIS — Z92.89 PERSONAL HISTORY OF OTHER MEDICAL TREATMENT: Chronic | ICD-10-CM

## 2022-09-05 LAB — SARS-COV-2 RNA SPEC QL NAA+PROBE: SIGNIFICANT CHANGE UP

## 2022-09-05 PROCEDURE — C9803: CPT

## 2022-09-05 PROCEDURE — U0003: CPT

## 2022-09-05 PROCEDURE — U0005: CPT

## 2022-09-06 ENCOUNTER — APPOINTMENT (OUTPATIENT)
Dept: DERMATOLOGY | Facility: CLINIC | Age: 22
End: 2022-09-06

## 2022-09-06 PROCEDURE — 99213 OFFICE O/P EST LOW 20 MIN: CPT

## 2022-09-06 RX ORDER — DOXYCYCLINE HYCLATE 100 MG/1
100 CAPSULE ORAL
Qty: 60 | Refills: 5 | Status: ACTIVE | COMMUNITY
Start: 2022-07-25 | End: 1900-01-01

## 2022-09-06 NOTE — ASSESSMENT
[Use of independent historian: [ enter independent historian's relationship to patient ] :____] : As the patient was unable to provide a complete and reliable history, I obtained clinical history from the patient’s [unfilled] [FreeTextEntry1] : Acne vulgaris: Significantly improved

## 2022-09-06 NOTE — PHYSICAL EXAM
[Alert] : alert [Oriented x 3] : ~L oriented x 3 [Well Nourished] : well nourished [FreeTextEntry3] : Face: Mild to moderate large papules and pustules, especially forehead\par Chest: Mild small papules\par Back: Mild to moderate papules and pustules\par

## 2022-09-06 NOTE — HISTORY OF PRESENT ILLNESS
[FreeTextEntry1] : Acne [de-identified] : Follow-up visit for 21-year-old mentally challenged white male first seen by me on July 25, 2022, with a history of acne on the face and back.  Acne was severe. \par  Initially treated elsewhere by dermatologist Nikolay Calvert MD, with doxycycline 100 mg p.o. twice daily, Benzamycin, and tazarotene cream 0.1% applied at night 3 nights per week.  \par \par At the last visit, treatment changed to:\par Continue doxycycline 100 mg p.o. twice daily\par Start 10% benzyl peroxide wash–wash face and trunk and shower–rinse off well\par \par Discontinue Benzamycin\par Discontinue tazarotene cream 0.1%

## 2022-09-29 ENCOUNTER — OUTPATIENT (OUTPATIENT)
Dept: OUTPATIENT SERVICES | Facility: HOSPITAL | Age: 22
LOS: 1 days | End: 2022-09-29
Payer: MEDICAID

## 2022-09-29 VITALS
RESPIRATION RATE: 18 BRPM | TEMPERATURE: 97 F | WEIGHT: 171.96 LBS | DIASTOLIC BLOOD PRESSURE: 80 MMHG | SYSTOLIC BLOOD PRESSURE: 120 MMHG | HEART RATE: 73 BPM | OXYGEN SATURATION: 97 % | HEIGHT: 67 IN

## 2022-09-29 DIAGNOSIS — Z92.89 PERSONAL HISTORY OF OTHER MEDICAL TREATMENT: Chronic | ICD-10-CM

## 2022-09-29 DIAGNOSIS — K05.6 PERIODONTAL DISEASE, UNSPECIFIED: ICD-10-CM

## 2022-09-29 DIAGNOSIS — K02.62 DENTAL CARIES ON SMOOTH SURFACE PENETRATING INTO DENTIN: ICD-10-CM

## 2022-09-29 DIAGNOSIS — K02.9 DENTAL CARIES, UNSPECIFIED: ICD-10-CM

## 2022-09-29 DIAGNOSIS — Z01.818 ENCOUNTER FOR OTHER PREPROCEDURAL EXAMINATION: ICD-10-CM

## 2022-09-29 DIAGNOSIS — Z98.890 OTHER SPECIFIED POSTPROCEDURAL STATES: Chronic | ICD-10-CM

## 2022-09-29 LAB
ANION GAP SERPL CALC-SCNC: 11 MMOL/L — SIGNIFICANT CHANGE UP (ref 5–17)
BUN SERPL-MCNC: 14 MG/DL — SIGNIFICANT CHANGE UP (ref 7–23)
CALCIUM SERPL-MCNC: 9.6 MG/DL — SIGNIFICANT CHANGE UP (ref 8.4–10.5)
CHLORIDE SERPL-SCNC: 107 MMOL/L — SIGNIFICANT CHANGE UP (ref 96–108)
CO2 SERPL-SCNC: 27 MMOL/L — SIGNIFICANT CHANGE UP (ref 22–31)
CREAT SERPL-MCNC: 0.78 MG/DL — SIGNIFICANT CHANGE UP (ref 0.5–1.3)
EGFR: 129 ML/MIN/1.73M2 — SIGNIFICANT CHANGE UP
GLUCOSE SERPL-MCNC: 89 MG/DL — SIGNIFICANT CHANGE UP (ref 70–99)
HCT VFR BLD CALC: 40.5 % — SIGNIFICANT CHANGE UP (ref 39–50)
HGB BLD-MCNC: 13.8 G/DL — SIGNIFICANT CHANGE UP (ref 13–17)
MCHC RBC-ENTMCNC: 33 PG — SIGNIFICANT CHANGE UP (ref 27–34)
MCHC RBC-ENTMCNC: 34.1 GM/DL — SIGNIFICANT CHANGE UP (ref 32–36)
MCV RBC AUTO: 96.9 FL — SIGNIFICANT CHANGE UP (ref 80–100)
NRBC # BLD: 0 /100 WBCS — SIGNIFICANT CHANGE UP (ref 0–0)
PLATELET # BLD AUTO: 172 K/UL — SIGNIFICANT CHANGE UP (ref 150–400)
POTASSIUM SERPL-MCNC: 4.1 MMOL/L — SIGNIFICANT CHANGE UP (ref 3.5–5.3)
POTASSIUM SERPL-SCNC: 4.1 MMOL/L — SIGNIFICANT CHANGE UP (ref 3.5–5.3)
RBC # BLD: 4.18 M/UL — LOW (ref 4.2–5.8)
RBC # FLD: 12.4 % — SIGNIFICANT CHANGE UP (ref 10.3–14.5)
SODIUM SERPL-SCNC: 145 MMOL/L — SIGNIFICANT CHANGE UP (ref 135–145)
WBC # BLD: 4.91 K/UL — SIGNIFICANT CHANGE UP (ref 3.8–10.5)
WBC # FLD AUTO: 4.91 K/UL — SIGNIFICANT CHANGE UP (ref 3.8–10.5)

## 2022-09-29 PROCEDURE — 80048 BASIC METABOLIC PNL TOTAL CA: CPT

## 2022-09-29 PROCEDURE — 85027 COMPLETE CBC AUTOMATED: CPT

## 2022-09-29 PROCEDURE — 36415 COLL VENOUS BLD VENIPUNCTURE: CPT

## 2022-09-29 RX ORDER — NYSTATIN CREAM 100000 [USP'U]/G
1 CREAM TOPICAL
Qty: 0 | Refills: 0 | DISCHARGE

## 2022-09-29 RX ORDER — DIVALPROEX SODIUM 500 MG/1
7 TABLET, DELAYED RELEASE ORAL
Qty: 0 | Refills: 0 | DISCHARGE

## 2022-09-29 RX ORDER — LANOLIN ALCOHOL/MO/W.PET/CERES
5 CREAM (GRAM) TOPICAL
Qty: 0 | Refills: 0 | DISCHARGE

## 2022-09-29 NOTE — H&P PST ADULT - NSICDXPASTMEDICALHX_GEN_ALL_CORE_FT
PAST MEDICAL HISTORY:  Autism     Dental caries     Ebstein anomaly -sx 2010    Seizures -onset [id number], last episode 11/25/2020 with ER visit, followed by neuro last visit 12/3/2020 as per mom

## 2022-09-29 NOTE — H&P PST ADULT - OTHER CARE PROVIDERS
Cardiologist- [facility] /Neurologist--Dr. Iraj Hernandez, [place], [phone] Cardiologist- Dr Juan Jose Le [phone], Dr DANN Mitchell ( psych ) [phone], Dr Iraj carmona ( neuro) [phone]

## 2022-09-29 NOTE — H&P PST ADULT - PROBLEM SELECTOR PLAN 1
comprehensive Dental exam   cbc, bmp comprehensive Dental exam   cbc, bmp  Preop instructions given to group home RN. Instructed to give anti-epilepsy medications with sip of water on DOS. Not to be given with any food like applesauce/ pudding  Labs CBC BMP performed in PST

## 2022-09-29 NOTE — H&P PST ADULT - NSICDXPASTSURGICALHX_GEN_ALL_CORE_FT
PAST SURGICAL HISTORY:  H/O heart surgery 2010       History of dental surgery 2019 with Dr. Esquivel at [facility]     PAST SURGICAL HISTORY:  H/O heart surgery He had cone procedure, right sided maze and primary PFO closure on October 8, 2010 by Dr. Tera Ramsey at [facility].      History of dental surgery 2019 with Dr. Esquivel at [place]

## 2022-09-29 NOTE — H&P PST ADULT - HISTORY OF PRESENT ILLNESS
This is a 21 year old male with past medical history of Autism (non-verbal at baseline), Seizure Disorder (last seizure April 2022 and followed up with Neurology in July 2022), Ebstein Anomaly repair, S/P Cardiac Surgery 2010 with Periodontal Disease. Previous dental treatment done on 3/16/2022. He presents to [facility] today for evaluation prior to scheduled Comprehensive Dental Treatment under Anesthesia on 9/7/22 with Dr. Sherman. Spoke to [facility]  [phone], who helped contribute to this history.     **Legal guardian- Mother, to give telephone consent  **Covid swab on 9/5 @ [facility]  **Denies any history of Covid   ** Covid Unvaccinated- Refused by mother     This is a 21 year old male with past medical history of Autism (non-verbal at baseline), Seizure Disorder (last seizure April 2022 and followed up with Neurology in July 2022), Ebstein Anomaly repair, S/P Cardiac Surgery 2010 with Periodontal Disease. Previous dental treatment done on 3/16/2022. He presents to [facility] today for evaluation prior to scheduled Comprehensive Dental Treatment under Anesthesia on 9/7/22 with Dr. Sherman. Spoke to [facility]  [phone], who helped contribute to this history.     **Legal guardian- Mother, to give telephone consent ( Sharri Samuels)   **Covid swab on 9/5 @ [facility]  **Denies any history of Covid   ** Covid Unvaccinated- Refused by mother     This is a 21 year old male with past medical history of Autism (non-verbal at baseline), Seizure Disorder (last seizure April 2022 and followed up with Neurology in July 2022), Ebstein Anomaly repair, S/P Cardiac Surgery 2010 with Periodontal Disease. Previous dental treatment done on 3/16/2022. He presents to [facility] today for evaluation prior to scheduled Comprehensive Dental Treatment under Anesthesia on 9/7/22 with Dr. Sherman. Spoke to [facility]  [phone], who helped contribute to this history.     **Legal guardian- Mother, to give telephone consent ( Sharri Samuels)   **Covid swab on 10/14 @ [facility] @10:50  **Denies any history of Covid   ** Covid Unvaccinated- Refused by mother

## 2022-10-11 ENCOUNTER — OUTPATIENT (OUTPATIENT)
Dept: OUTPATIENT SERVICES | Facility: HOSPITAL | Age: 22
LOS: 1 days | End: 2022-10-11
Payer: MEDICAID

## 2022-10-11 DIAGNOSIS — Z98.890 OTHER SPECIFIED POSTPROCEDURAL STATES: Chronic | ICD-10-CM

## 2022-10-11 DIAGNOSIS — Z92.89 PERSONAL HISTORY OF OTHER MEDICAL TREATMENT: Chronic | ICD-10-CM

## 2022-10-11 DIAGNOSIS — Z11.52 ENCOUNTER FOR SCREENING FOR COVID-19: ICD-10-CM

## 2022-10-11 LAB — SARS-COV-2 RNA SPEC QL NAA+PROBE: SIGNIFICANT CHANGE UP

## 2022-10-11 PROCEDURE — U0005: CPT

## 2022-10-11 PROCEDURE — U0003: CPT

## 2022-10-11 PROCEDURE — C9803: CPT

## 2022-10-13 ENCOUNTER — TRANSCRIPTION ENCOUNTER (OUTPATIENT)
Age: 22
End: 2022-10-13

## 2022-10-14 ENCOUNTER — TRANSCRIPTION ENCOUNTER (OUTPATIENT)
Age: 22
End: 2022-10-14

## 2022-10-14 ENCOUNTER — OUTPATIENT (OUTPATIENT)
Dept: INPATIENT UNIT | Facility: HOSPITAL | Age: 22
LOS: 1 days | End: 2022-10-14
Payer: MEDICAID

## 2022-10-14 VITALS — OXYGEN SATURATION: 96 % | HEART RATE: 49 BPM

## 2022-10-14 VITALS
DIASTOLIC BLOOD PRESSURE: 90 MMHG | HEIGHT: 67 IN | RESPIRATION RATE: 18 BRPM | OXYGEN SATURATION: 100 % | TEMPERATURE: 99 F | WEIGHT: 171.96 LBS | SYSTOLIC BLOOD PRESSURE: 124 MMHG | HEART RATE: 71 BPM

## 2022-10-14 DIAGNOSIS — K02.62 DENTAL CARIES ON SMOOTH SURFACE PENETRATING INTO DENTIN: ICD-10-CM

## 2022-10-14 DIAGNOSIS — K05.6 PERIODONTAL DISEASE, UNSPECIFIED: ICD-10-CM

## 2022-10-14 DIAGNOSIS — Z98.890 OTHER SPECIFIED POSTPROCEDURAL STATES: Chronic | ICD-10-CM

## 2022-10-14 DIAGNOSIS — Z92.89 PERSONAL HISTORY OF OTHER MEDICAL TREATMENT: Chronic | ICD-10-CM

## 2022-10-14 PROCEDURE — 41820 EXCISION GUM EACH QUADRANT: CPT

## 2022-10-14 PROCEDURE — D2332: CPT

## 2022-10-14 PROCEDURE — D2392: CPT

## 2022-10-14 PROCEDURE — D7140: CPT

## 2022-10-14 PROCEDURE — D4341: CPT

## 2022-10-14 PROCEDURE — D2394: CPT

## 2022-10-14 PROCEDURE — C9399: CPT

## 2022-10-14 RX ORDER — LACTOBACILLUS ACIDOPHILUS 100MM CELL
1 CAPSULE ORAL
Qty: 0 | Refills: 0 | DISCHARGE

## 2022-10-14 RX ORDER — SODIUM CHLORIDE 9 MG/ML
3 INJECTION INTRAMUSCULAR; INTRAVENOUS; SUBCUTANEOUS EVERY 8 HOURS
Refills: 0 | Status: DISCONTINUED | OUTPATIENT
Start: 2022-10-14 | End: 2022-10-14

## 2022-10-14 RX ORDER — LIDOCAINE HCL 20 MG/ML
0.2 VIAL (ML) INJECTION ONCE
Refills: 0 | Status: DISCONTINUED | OUTPATIENT
Start: 2022-10-14 | End: 2022-10-14

## 2022-10-14 RX ORDER — ONDANSETRON 8 MG/1
4 TABLET, FILM COATED ORAL ONCE
Refills: 0 | Status: DISCONTINUED | OUTPATIENT
Start: 2022-10-14 | End: 2022-10-14

## 2022-10-14 RX ORDER — ASCORBIC ACID 60 MG
1 TABLET,CHEWABLE ORAL
Qty: 0 | Refills: 0 | DISCHARGE

## 2022-10-14 RX ORDER — RISPERIDONE 4 MG/1
2.5 TABLET ORAL
Qty: 0 | Refills: 0 | DISCHARGE

## 2022-10-14 RX ORDER — RISPERIDONE 4 MG/1
3 TABLET ORAL
Qty: 0 | Refills: 0 | DISCHARGE

## 2022-10-14 RX ORDER — MIDAZOLAM HYDROCHLORIDE 1 MG/ML
20 INJECTION, SOLUTION INTRAMUSCULAR; INTRAVENOUS ONCE
Refills: 0 | Status: DISCONTINUED | OUTPATIENT
Start: 2022-10-14 | End: 2022-10-14

## 2022-10-14 RX ORDER — LEVOTHYROXINE SODIUM 125 MCG
1 TABLET ORAL
Qty: 0 | Refills: 0 | DISCHARGE

## 2022-10-14 RX ORDER — HYDROMORPHONE HYDROCHLORIDE 2 MG/ML
0.25 INJECTION INTRAMUSCULAR; INTRAVENOUS; SUBCUTANEOUS
Refills: 0 | Status: DISCONTINUED | OUTPATIENT
Start: 2022-10-14 | End: 2022-10-14

## 2022-10-14 RX ORDER — CLONAZEPAM 1 MG
1 TABLET ORAL
Qty: 0 | Refills: 0 | DISCHARGE

## 2022-10-14 RX ORDER — ARIPIPRAZOLE 15 MG/1
0.5 TABLET ORAL
Qty: 0 | Refills: 0 | DISCHARGE

## 2022-10-14 RX ORDER — DIVALPROEX SODIUM 500 MG/1
7 TABLET, DELAYED RELEASE ORAL
Qty: 0 | Refills: 0 | DISCHARGE

## 2022-10-14 RX ADMIN — MIDAZOLAM HYDROCHLORIDE 20 MILLIGRAM(S): 1 INJECTION, SOLUTION INTRAMUSCULAR; INTRAVENOUS at 09:34

## 2022-10-14 NOTE — ASU DISCHARGE PLAN (ADULT/PEDIATRIC) - NS MD DC FALL RISK RISK
For information on Fall & Injury Prevention, visit: [email or web address] OR  [email or web address] OR  https://www.cdc.gov/steadi/patient.html

## 2022-10-14 NOTE — ASU PATIENT PROFILE, ADULT - HOW PATIENT ADDRESSED, PROFILE
stormy Acitretin Counseling:  I discussed with the patient the risks of acitretin including but not limited to hair loss, dry lips/skin/eyes, liver damage, hyperlipidemia, depression/suicidal ideation, photosensitivity.  Serious rare side effects can include but are not limited to pancreatitis, pseudotumor cerebri, bony changes, clot formation/stroke/heart attack.  Patient understands that alcohol is contraindicated since it can result in liver toxicity and significantly prolong the elimination of the drug by many years.

## 2022-10-14 NOTE — ASU PATIENT PROFILE, ADULT - FALL HARM RISK - HARM RISK INTERVENTIONS

## 2022-10-14 NOTE — ASU DISCHARGE PLAN (ADULT/PEDIATRIC) - ASU DC SPECIAL INSTRUCTIONSFT
comprehensive dental treatment under general anesthesia     tylenol prn pain    see Dr Sherman on 10/25 at 10:15 am at [facility], appointment is set     return to program on Monday    exam, xrays, cleaning, restorations and one loose tooth extracted to the upper left anterior as the tooth was loose and at risk of aspiration     no straw for two days, one dissolvable suture was placed

## 2022-12-05 ENCOUNTER — APPOINTMENT (OUTPATIENT)
Dept: DERMATOLOGY | Facility: CLINIC | Age: 22
End: 2022-12-05

## 2022-12-05 DIAGNOSIS — D69.2 OTHER NONTHROMBOCYTOPENIC PURPURA: ICD-10-CM

## 2022-12-05 PROCEDURE — 99213 OFFICE O/P EST LOW 20 MIN: CPT

## 2022-12-05 NOTE — HISTORY OF PRESENT ILLNESS
[FreeTextEntry1] : Acne [de-identified] : Follow-up visit for 21-year-old mentally challenged white male last seen by me in September 6, 2022, with acne on the face and back.  Currently treated with:\par Continue doxycycline 100 mg p.o. twice a day\par Continue 10% benzoyl peroxide wash–wash face and trunk in shower–rinse off well\par \par Note: Patient treated elsewhere in past by dermatologist Nikolay Street MD, with doxycycline 100 mg p.o. twice daily, Benzamycin, and tazarotene cream 0.1% applied sparingly at night 3 nights per week\par \par Acne has improved.\par \par Patient also developed an asymptomatic rash in the right shoulder area of uncertain duration [id number]

## 2022-12-05 NOTE — ASSESSMENT
[Use of independent historian: [ enter independent historian's relationship to patient ] :____] : As the patient was unable to provide a complete and reliable history, I obtained clinical history from the patient’s [unfilled] [FreeTextEntry1] : Acne vulgaris–improved\par Purpuric macules on right shoulder–probably secondary to prior trauma or rubbing of the area

## 2022-12-05 NOTE — PHYSICAL EXAM
[Alert] : alert [Oriented x 3] : ~L oriented x 3 [Well Nourished] : well nourished [FreeTextEntry3] : Face: Moderate papules\par Chest and back: Mild papules\par \par Note: Left deltoid: Multiple almost confluent small purpuric macules

## 2023-03-06 ENCOUNTER — APPOINTMENT (OUTPATIENT)
Dept: DERMATOLOGY | Facility: CLINIC | Age: 23
End: 2023-03-06
Payer: MEDICAID

## 2023-03-06 PROCEDURE — 99213 OFFICE O/P EST LOW 20 MIN: CPT

## 2023-03-06 NOTE — PHYSICAL EXAM
[Alert] : alert [Oriented x 3] : ~L oriented x 3 [Well Nourished] : well nourished [FreeTextEntry3] : Face: Moderate large papules–especially forehead and upper outer cheeks\par Chest: Rare papules, excoriated\par Back: Moderate small papules

## 2023-03-06 NOTE — HISTORY OF PRESENT ILLNESS
[FreeTextEntry1] : Acne [de-identified] : Follow-up visit for 22-year-old mentally challenged white male last seen by me on December 5, 2022, with acne on the face and back.  Treatment modified at the last visit to:\par Discontinue doxycycline (patient mother's request)\par Continue 10% benzoyl peroxide wash–wash face and trunk in shower–rinse off well\par Start 1% clindamycin lotion to face, chest, and back twice a day

## 2023-03-22 ENCOUNTER — NON-APPOINTMENT (OUTPATIENT)
Age: 23
End: 2023-03-22

## 2023-07-06 ENCOUNTER — APPOINTMENT (OUTPATIENT)
Dept: DERMATOLOGY | Facility: CLINIC | Age: 23
End: 2023-07-06
Payer: MEDICAID

## 2023-07-06 PROCEDURE — 99213 OFFICE O/P EST LOW 20 MIN: CPT

## 2023-07-06 NOTE — PHYSICAL EXAM
[Alert] : alert [Oriented x 3] : ~L oriented x 3 [Well Nourished] : well nourished [FreeTextEntry3] : Face: Moderate multiple large papules–especially forehead and cheeks\par Back: Mild to moderate papules

## 2023-07-06 NOTE — HISTORY OF PRESENT ILLNESS
[FreeTextEntry1] : Acne [de-identified] : Follow-up visit for 22-year-old mentally challenged white male last seen by me on March 6, 2023, with acne on the face and back.\par Currently treated with:\par Continue 1% clindamycin lotion to face, chest, and back twice a day\par Continue 10% benzoyl peroxide wash–wash face and trunk in shower–rinse off well\par \par Note: Patient had previously been treated with doxycycline 100 mg p.o. twice daily.\par

## 2023-07-15 NOTE — H&P PST ADULT - NSICDXPASTSURGICALHX_GEN_ALL_CORE_FT
not applicable done PAST SURGICAL HISTORY:  H/O heart surgery 2010       History of dental surgery 2019 with Dr. Esquivel at [place]

## 2023-11-08 ENCOUNTER — APPOINTMENT (OUTPATIENT)
Dept: DERMATOLOGY | Facility: CLINIC | Age: 23
End: 2023-11-08
Payer: MEDICAID

## 2023-11-08 DIAGNOSIS — L70.0 ACNE VULGARIS: ICD-10-CM

## 2023-11-08 PROCEDURE — 99213 OFFICE O/P EST LOW 20 MIN: CPT

## 2023-11-08 RX ORDER — BENZOYL PEROXIDE 100 MG/ML
10 LIQUID TOPICAL
Qty: 1 | Refills: 5 | Status: ACTIVE | COMMUNITY
Start: 2022-07-25

## 2023-11-21 NOTE — H&P PST ADULT - NS PRO PASSIVE SMOKE EXP
----- Message from Mike Niño No

## 2024-01-04 NOTE — H&P PST ADULT - ENMT
OCCUPATIONAL THERAPY EVALUATION  Type of Visit: Evaluation    See electronic medical record for Abuse and Falls Screening details.    Subjective      Presenting condition or subjective complaint: Stroke, left side weakness, left hand numb/weak  Date of onset: 12/09/23    Relevant medical history: High blood pressure; Sleep disorder like apnea; Stroke   Dates & types of surgery: Low back, right pinky finger, left pointer and middle finger    Prior diagnostic imaging/testing results: CT scan     Prior therapy history for the same diagnosis, illness or injury: Yes Home care PT after hospital    Prior Level of Function  Transfers: Independent  Ambulation: Independent  ADL: Independent  IADL: Driving, Finances, Housekeeping, Laundry, Meal preparation, Medication management, Work, Yard work    Living Environment  Social support: With a significant other or spouse   Type of home: House   Stairs to enter the home: Yes 6     Ramp: No   Stairs inside the home: Yes 10 Is there a railing: Yes   Help at home: None  Equipment owned: Straight Cane; Walker; Walker with wheels     Employment: Yes   Hobbies/Interests: Work outside, being with family    Patient goals for therapy: have strength back in left hand, go back to work, drive    Pain assessment: Pain present     Objective     Cognitive Status Examination  Orientation: Oriented to person, place and time   Level of Consciousness: Alert  Follows Commands and Answers Questions: 100% of the time, Follows multi step instructions  Personal Safety and Judgement: Intact  Memory: Intact  Attention: No deficits identified  Organization/Problem Solving: No deficits identified  Executive Function: No deficits identified    VISUAL SKILLS  Visual Acuity: Wears glasses  Visual Field: Appears normal  Visual Attention: Appears normal  Oculomotor: WNL    SENSATION:  reporting normal sensation for hot and cold, but reports that maybe it feels a little different    POSTURE:  WFL  RANGE OF MOTION: UE AROM WFL  STRENGTH:  Left shoulder flexion:  4/5, left elbow flexion/extension 5/5, left wrist extension 4/5  :  Right:    MUSCLE TONE: WFL  COORDINATION: UE Coordination: impaired on left, tremor movement with writing  Fine Motor Coordination: impaired manipulation of small objects  Left Hand, Nine Hole Peg Test (sec): 1:00 minute  Right Hand, Nine Hole Peg Test (sec): 27 seconds  BALANCE:  working with PT    FUNCTIONAL MOBILITY  Assistive Device(s): None  Ambulation: ambulatory without device, working with PT    BED MOBILITY: WFL    TRANSFERS: WFL    BATHING: WFL    UPPER BODY DRESSING: WFL  Equipment: Button aid    TOILETING: WFL    GROOMING: WFL  Equipment: Built up handle    EATING/SELF FEEDING:  Left hand preference, working on using left hand to eat and manage utensils for meal prep      ACTIVITY TOLERANCE: improving, but not where he was prior to the stroke    INSTRUMENTAL ACTIVITIES OF DAILY LIVING (IADL):   Meal Planning/Prep: participating in meal prep, noted cutting up of items can be harder  Communication/Computer Use: Engages with   Community Mobility: currently not driving, mainly drives to work and back.  Wants to be able to drive again    Assessment & Plan   CLINICAL IMPRESSIONS  Medical Diagnosis: Acute right arterial ischemic stroke, MCA (middle cerebral artery) (163.511)    Treatment Diagnosis: Left UE weakness and incoordination impacting ADL's, work and leisure tasks    Impression/Assessment: Pt is a 65 year old male presenting to Occupational Therapy due to history of acute CVA.  The following significant findings have been identified: Impaired activity tolerance, Impaired coordination, and Impaired strength.  These identified deficits interfere with their ability to perform self care tasks, work tasks, recreational activities, household chores, driving ,  yard work, and meal planning and preparation as compared to previous level of function.   Patient's  limitations or Problem List includes: Weakness, Decreased , Decreased pinch, and Decreased coordination of the left shoulder, wrist, and hand which interferes with the patient's ability to perform Self Care Tasks (eating), Work Tasks, Recreational Activities, Household Chores, and Driving  as compared to previous level of function.    Clinical Decision Making (Complexity):  Assessment of Occupational Performance: 3-5 Performance Deficits  Occupational Performance Limitations: feeding, driving and community mobility, home establishment and management, and work  Clinical Decision Making (Complexity): Low complexity    PLAN OF CARE  Treatment Interventions:  Interventions: Self-Care/Home Management, Therapeutic Activity, Therapeutic Exercise    Long Term Goals   OT Goal 1  Goal Identifier: Home Programming  Goal Description: Pt will verbalize and demonstrate follow through with HEP in prep of improving left UE functions  Rationale: In order to maximize safety and independence with performance of self-care activities  Target Date: 02/28/24  OT Goal 2  Goal Identifier: Fine finger strength  Goal Description: As measure by improving ryan pinch and lateral pinch by 7#, pt will demonstrate improved ability to manipulate objects and pinch with fingers such as opening containers  Rationale: In order to maximize safety and independence with performance of self-care activities  Target Date: 02/28/24  OT Goal 3  Goal Identifier: Left hand coordination  Goal Description: As measured by a score of 35 seconds or less on the 9 hole peg test with the left hand, he will demonstrate the ability to improve his hand writing and manipulation of small objects  Rationale: In order to maximize safety and independence with performance of self-care activities  Target Date: 02/28/24  OT Goal 4  Goal Identifier: Driving  Goal Description: Donnie will demonstrate safe pre driving skills such as reaction of 52pts or higher on mode A and 35pts  or higher on mode B as a measure of safe reaction for driving  Rationale: In order to maximize safety and independence with performance of self-care activities  Target Date: 02/28/24      Frequency of Treatment: 1x week  Duration of Treatment: 8 weeks     Recommended Referrals to Other Professionals: Occupational Therapy, Physical Therapy  Education Assessment: Learner/Method: Patient;Listening;Reading;Demonstration;Pictures/Video     Risks and benefits of evaluation/treatment have been explained.   Patient/Family/caregiver agrees with Plan of Care.     Evaluation Time:    OT Eval, Low Complexity Minutes (19295): 15   Present: Not applicable     Signing Clinician: Lui Yao OT                     details…

## 2024-05-07 NOTE — H&P PST ADULT - MALLAMPATI CLASS
Problem: Pain  Goal: Verbalizes/displays adequate comfort level or baseline comfort level  Outcome: Progressing     Problem: Skin/Tissue Integrity  Goal: Absence of new skin breakdown  Description: 1.  Monitor for areas of redness and/or skin breakdown  2.  Assess vascular access sites hourly  3.  Every 4-6 hours minimum:  Change oxygen saturation probe site  4.  Every 4-6 hours:  If on nasal continuous positive airway pressure, respiratory therapy assess nares and determine need for appliance change or resting period.  Outcome: Progressing     Problem: Safety - Adult  Goal: Free from fall injury  Outcome: Progressing     Problem: Chronic Conditions and Co-morbidities  Goal: Patient's chronic conditions and co-morbidity symptoms are monitored and maintained or improved  Outcome: Progressing   CHF Care Plan      Patient's EF (Ejection Fraction) is greater than 40%    Heart Failure Medications:  Diuretics:: Furosemide    (One of the following REQUIRED for EF </= 40%/SYSTOLIC FAILURE but MAY be used in EF% >40%/DIASTOLIC FAILURE)        ACE:: None        ARB:: None         ARNI:: None    (Beta Blockers)  NON- Evidenced Based Beta Blocker (for EF% >40%/DIASTOLIC FAILURE): None    Evidenced Based Beta Blocker::(REQUIRED for EF% <40%/SYSTOLIC FAILURE) Carvedilol- Coreg  ...................................................................................................................................................    Failed to redirect to the Timeline version of the [employer] SmartLink.      Patient's weights and intake/output reviewed    Daily Weight log at bedside, patient/family participation in use of log: \"yes    Patient's current weight today shows a difference of 1 lbs less than last documented weight.      Intake/Output Summary (Last 24 hours) at 5/7/2024 0530  Last data filed at 5/7/2024 0406  Gross per 24 hour   Intake 1340 ml   Output 3625 ml   Net -2285 ml       Education Booklet Provided: yes    Comorbidities  unable to assess

## 2024-05-10 RX ORDER — CLINDAMYCIN PHOSPHATE 10 MG/ML
1 LOTION TOPICAL
Qty: 1 | Refills: 5 | Status: ACTIVE | COMMUNITY
Start: 2022-12-05 | End: 1900-01-01

## 2024-09-09 NOTE — H&P PST ADULT - RESPIRATORY RATE (BREATHS/MIN)
September 9, 2024      [facility]  [id number]  [place] [address]  Phone: [phone]  Fax: [phone]       Patient: Tahir Sanford   YOB: 2026  Date of Visit: 2024    To Whom It May Concern:    Perlita Sanford  was at [facility] on 2024. The patient may return to work/school on 2024 with no restrictions. If you have any questions or concerns, or if I can be of further assistance, please do not hesitate to contact me.    Sincerely,    Shailesh Fletcher CMA      16

## 2024-10-14 ENCOUNTER — NON-APPOINTMENT (OUTPATIENT)
Age: 24
End: 2024-10-14

## 2024-11-04 ENCOUNTER — APPOINTMENT (OUTPATIENT)
Dept: OPHTHALMOLOGY | Facility: CLINIC | Age: 24
End: 2024-11-04

## 2025-03-26 NOTE — ASU DISCHARGE PLAN (ADULT/PEDIATRIC) - FOLLOW UP APPOINTMENTS
Problem: Adult Inpatient Plan of Care  Goal: Plan of Care Review  Outcome: Progressing  Goal: Patient-Specific Goal (Individualized)  Outcome: Progressing  Goal: Absence of Hospital-Acquired Illness or Injury  Outcome: Progressing  Goal: Optimal Comfort and Wellbeing  Outcome: Progressing  Goal: Readiness for Transition of Care  Outcome: Progressing      827

## (undated) DEVICE — GLV 7.5 PROTEXIS (WHITE)

## (undated) DEVICE — MEDICATION LABELS W MARKER

## (undated) DEVICE — DRAPE INSTRUMENT POUCH 6.75" X 11"

## (undated) DEVICE — VISITEC 4X4

## (undated) DEVICE — FOLEY TRAY 16FR 5CC LTX UMETER CLOSED

## (undated) DEVICE — SPECIMEN CONTAINER 100ML

## (undated) DEVICE — SOL IRR POUR NS 0.9% 500ML

## (undated) DEVICE — PACK BASIC GOWN

## (undated) DEVICE — GLV 7 PROTEXIS (WHITE)

## (undated) DEVICE — VAGINAL PACKING 2 X 6"

## (undated) DEVICE — POSITIONER FOAM EGG CRATE ULNAR 2PCS (PINK)

## (undated) DEVICE — WARMING BLANKET LOWER ADULT

## (undated) DEVICE — GOWN TRIMAX LG

## (undated) DEVICE — DRAPE LIGHT HANDLE COVER (BLUE)

## (undated) DEVICE — DRAPE MAYO STAND 30"

## (undated) DEVICE — VENODYNE/SCD SLEEVE CALF LARGE

## (undated) DEVICE — SOL IRR POUR H2O 250ML

## (undated) DEVICE — GLV 6.5 PROTEXIS (WHITE)

## (undated) DEVICE — LAP PAD 18 X 18"